# Patient Record
Sex: FEMALE | Race: WHITE | NOT HISPANIC OR LATINO | Employment: OTHER | URBAN - METROPOLITAN AREA
[De-identification: names, ages, dates, MRNs, and addresses within clinical notes are randomized per-mention and may not be internally consistent; named-entity substitution may affect disease eponyms.]

---

## 2017-07-31 ENCOUNTER — TRANSCRIBE ORDERS (OUTPATIENT)
Dept: ADMINISTRATIVE | Facility: HOSPITAL | Age: 52
End: 2017-07-31

## 2017-07-31 DIAGNOSIS — Z12.31 VISIT FOR SCREENING MAMMOGRAM: Primary | ICD-10-CM

## 2017-08-04 ENCOUNTER — HOSPITAL ENCOUNTER (OUTPATIENT)
Dept: MAMMOGRAPHY | Facility: HOSPITAL | Age: 52
Discharge: HOME | End: 2017-08-04
Payer: COMMERCIAL

## 2017-08-04 DIAGNOSIS — Z12.31 VISIT FOR SCREENING MAMMOGRAM: ICD-10-CM

## 2017-08-04 PROCEDURE — G0202 SCR MAMMO BI INCL CAD: HCPCS

## 2017-08-07 ENCOUNTER — GENERIC CONVERSION - ENCOUNTER (OUTPATIENT)
Dept: OTHER | Facility: OTHER | Age: 52
End: 2017-08-07

## 2017-08-09 ENCOUNTER — ALLSCRIPTS OFFICE VISIT (OUTPATIENT)
Dept: OTHER | Facility: OTHER | Age: 52
End: 2017-08-09

## 2017-09-09 DIAGNOSIS — D50.9 IRON DEFICIENCY ANEMIA: ICD-10-CM

## 2017-09-09 DIAGNOSIS — E78.5 HYPERLIPIDEMIA: ICD-10-CM

## 2017-09-09 DIAGNOSIS — E03.9 HYPOTHYROIDISM: ICD-10-CM

## 2017-09-09 DIAGNOSIS — D47.3 ESSENTIAL HEMORRHAGIC THROMBOCYTHEMIA (HCC): ICD-10-CM

## 2017-09-21 ENCOUNTER — GENERIC CONVERSION - ENCOUNTER (OUTPATIENT)
Dept: OTHER | Facility: OTHER | Age: 52
End: 2017-09-21

## 2017-09-21 ENCOUNTER — LAB CONVERSION - ENCOUNTER (OUTPATIENT)
Dept: OTHER | Facility: OTHER | Age: 52
End: 2017-09-21

## 2017-09-21 LAB — TSH SERPL DL<=0.05 MIU/L-ACNC: 0.7 MIU/L

## 2017-12-21 DIAGNOSIS — E03.9 HYPOTHYROIDISM: ICD-10-CM

## 2018-01-10 NOTE — RESULT NOTES
Message   Recorded as Task   Date: 09/29/2016 09:43 AM, Created By: Ursula Zelaya   Task Name: Call Patient with results   Assigned To: Flavio Hermosillo   Regarding Patient: Mohini Petersen, Status: Active   Comment:    Flavio Hermosillo - 29 Sep 2016 9:43 AM     Patient Phone: [phone]    His hemoglobin has risen to 10 1  She still needs to see GI but this is much improved  Repeat CBC in one month  Patient unfortunately still has blood in her urine  Please schedule ultrasound of renal bladder refer to urology   Natalie Kohler - 03 Oct 2016 2:45 PM     TASK EDITED                 pt notified of results and recommendations  she thinks we "are going down the wrong road" with additional testing and specialist consults  orders for ultrasound, repeat CBC, and urology consult mailed to patient  Plan  Hematuria    · US KIDNEY AND BLADDER; Status:Hold For - Scheduling,Retrospective By Protocol  Authorization;  Requested MJY:[id number];    · *1 - [facility] Physician Referral  Consult  Status: Active -  Retrospective By Protocol Authorization  Requested for: [id number]  Care Summary provided  : Yes    Signatures   Electronically signed by : Luz Marina Morales, ; Oct  3 2016  3:06PM EST                       (Author)

## 2018-01-11 NOTE — RESULT NOTES
Verified Results  (Q) [address], SCREEN [id number] 12:32PM Flavio Hermosillo   REPORT COMMENT:  FASTING:UNKNOWN     Test Name Result Flag Reference   COLOR YELLOW  YELLOW   APPEARANCE CLOUDY A CLEAR   SPECIFIC GRAVITY 1 023  1 001-1 035   PH 6 5  5 0-8 0   GLUCOSE NEGATIVE  NEGATIVE   KETONES NEGATIVE  NEGATIVE   OCCULT BLOOD TRACE A NEGATIVE   PROTEIN NEGATIVE  NEGATIVE

## 2018-01-11 NOTE — MISCELLANEOUS
Message   Recorded as Task   Date: 10/03/2016 04:34 PM, Created By: Ibis Cline   Task Name: Follow Up   Assigned To: Deven and Jenny,Clinical Team   Regarding Patient: Denise Schrader, Status: Active   CommentHildred Karin - 03 Oct 2016 4:34 PM     TASK CREATED  pt requested order for another U/A stating she was at end of menses for last test   per TS, ok for pt to repeat test   she will  order to take to [facility]        Active Problems    1  Hematuria (599 70) (R31 9)   2  Hematuria, microscopic (599 72) (R31 29)   3  Hyperlipidemia (272 4) (E78 5)   4  Hypocalcemia (275 41) (E83 51)   5  History of Hyponatremia (276 1) (E87 1)   6  Hypothyroidism (244 9) (E03 9)   7  Menorrhagia (626 2) (N92 0)   8  Microcytic anemia (280 9) (D50 9)   9  Screening for breast cancer (V76 10) (Z12 39)   10  Screening for cervical cancer (V76 2) (Z12 4)   11  Screening for colon cancer (V76 51) (Z12 11)    Current Meds   1  Calcium 600 MG Oral Tablet; TAKE 2 TABLETS ONCE DAILY; Therapy: 70WHM8298 to (Last Rx:69Fed3926) Ordered   2  Iron 325 (65 Fe) MG Oral Tablet; Take 1 tablet twice a day Recorded   3  Levothyroxine Sodium 112 MCG Oral Tablet; Take 1 tablet daily; Therapy: 03ZUW1372 to (Last LE:58SKZ1727)  Requested for: 82MZG5845; Status:   ACTIVE - Transmit to Pharmacy - Awaiting Verification Ordered   4  Pravastatin Sodium 40 MG Oral Tablet; Take 1 tablet daily; Therapy: 88CPR6142 to (Evaluate:72Yks9127)  Requested for: 59SML9695; Last   Rx:[id number] Ordered    Allergies    1   No Known Drug Allergies    Signatures   Electronically signed by : Em Seth, ; Oct  3 2016  4:42PM EST                       (Author)

## 2018-01-11 NOTE — RESULT NOTES
Message   Recorded as Task   Date: 09/29/2016 09:43 AM, Created By: Yamile Sylvester   Task Name: Call Patient with results   Assigned To: Flavio Hermosillo   Regarding Patient: Augustine Garcia, Status: Active   Comment:    Flavio Hermosillo - 29 Sep 2016 9:43 AM     Patient Phone: [phone]    His hemoglobin has risen to 10 1  She still needs to see GI but this is much improved  Repeat CBC in one month  Patient unfortunately still has blood in her urine  Please schedule ultrasound of renal bladder refer to urology   Natalie Kohler - 03 Oct 2016 2:45 PM     TASK EDITED                 pt notified of results and recommendations  she thinks we "are going down the wrong road" with additional testing and specialist consults  orders for ultrasound, repeat CBC, and urology consult mailed to patient       Signatures   Electronically signed by : Marcela Jacobsen, ; Oct  3 2016  2:46PM EST                       (Author)

## 2018-01-12 NOTE — RESULT NOTES
Message   Recorded as Task   Date: 08/07/2017 04:19 PM, Created By: Flavio Hermosillo   Task Name: Call Back   Assigned To: Deven and Jenny,Clinical Team   Regarding Patient: Lisa Headley, Status: In Progress   Comment:    Flavio Hermosillo - 07 Aug 2017 4:19 PM     TASK CREATED  Patient is very high lipids and TSH from a specialist to sinus copies of results    Patient needs to make an appointment within the next week for treatment   Natalie Kohler - 07 Aug 2017 5:05 PM     TASK IN PROGRESS   Natalie Kohler - 07 Aug 2017 5:17 PM     TASK EDITED  pt notified of out of range results - follow up appt  scheduled with KB        Signatures   Electronically signed by : Joshua Shaffer, ; Aug  7 2017  5:17PM EST                       (Author)

## 2018-01-12 NOTE — MISCELLANEOUS
Message   Recorded as Task   Date: 09/27/2016 08:01 AM, Created By: System   Task Name: Schedule Appointment   Assigned To: Chastity,Clinical Team   Regarding Patient: Noris Rubi, Status: In Progress   Comment:    System - 27 Sep 2016 8:01 AM     Preferred Communication: Mail  [facility]ing [facility]: [facility]    Referred To: [facility]  To Be Done: 27 Sep 2016   Abigail Petit - 10 Oct 2016 12:20 PM     TASK REASSIGNED: Previously Assigned To Candelaria Walton - 10 Oct 2016 12:25 PM     TASK IN PROGRESS   Juan Mena - 10 Oct 2016 3:17 PM     TASK EDITED                 [phone]   Farideh Babin - 10 Oct 2016 4:01 PM     TASK EDITED  10/10 spoke with pt and stated she is having kidney/bladder us and stool test to cover colonoscopy  pt was explained colonscopy is recommended at 51 and to speak to ordering dr  thank you   Adilene Erickson - 11 Oct 2016 3:12 PM     TASK REASSIGNED: Previously Assigned To CHASTITY,Team   Flavio Hermosillo - 11 Oct 2016 3:42 PM     TASK REPLIED TO: Previously Assigned To Chastity,Clinical Team    The things that she has described the ultrasound and a stool test does not a replacement for colonoscopy  Patient needs to be evaluated by gastroenterology to rule out GI blood losses cause of her anemia  This can be as simple as irritation as serious as a tumor   Marissa Sutton - 15 Oct 2016 8:37 AM     TASK EDITED   Natalie Kohler - 17 Oct 2016 2:55 PM     TASK IN PROGRESS   Natalie Kohler - 17 Oct 2016 2:58 PM     TASK EDITED                 [facility] informing pt of TS response and recommendation        Active Problems    1  Hematuria (599 70) (R31 9)   2  Hematuria, microscopic (599 72) (R31 29)   3  Hyperlipidemia (272 4) (E78 5)   4  Hypocalcemia (275 41) (E83 51)   5  History of Hyponatremia (276 1) (E87 1)   6  Hypothyroidism (244 9) (E03 9)   7  Menorrhagia (626 2) (N92 0)   8   Microcytic anemia (280 9) (D50 9)   9  Screening for breast cancer (V76 10) (Z12 39)   10  Screening for cervical cancer (V76 2) (Z12 4)   11  Screening for colon cancer (V76 51) (Z12 11)    Current Meds   1  Calcium 600 MG Oral Tablet; TAKE 2 TABLETS ONCE DAILY; Therapy: 55QBE7603 to (Last Rx:59Buw7720) Ordered   2  Iron 325 (65 Fe) MG Oral Tablet; Take 1 tablet twice a day Recorded   3  Levothyroxine Sodium 112 MCG Oral Tablet; Take 1 tablet daily; Therapy: 91UIP0141 to (Last Rx:13Oct2016)  Requested for: 13Oct2016 Ordered   4  Pravastatin Sodium 40 MG Oral Tablet; Take 1 tablet daily; Therapy: 49ZUQ7912 to (Evaluate:84Lom3168)  Requested for: 64PKQ2879; Last   Rx:18Ifx5047 Ordered    Allergies    1   No Known Drug Allergies    Signatures   Electronically signed by : William Jorge, ; Oct 17 2016  2:59PM EST                       (Author)

## 2018-01-12 NOTE — RESULT NOTES
Verified Results  (1) COMPREHENSIVE METABOLIC PANEL [id number] 96:16VE Andrea Hermosillo     Test Name Result Flag Reference   GLUCOSE 87 mg/dL  65-99   Fasting reference interval   UREA NITROGEN (BUN) 12 mg/dL  7-25   CREATININE 0 86 mg/dL  0 50-1 05   For patients >52years of age, the reference limit  for Creatinine is approximately 13% higher for people  identified as -American  eGFR NON-AFR  AMERICAN 79 mL/min/1 73m2  > OR = 60   eGFR AFRICAN AMERICAN 91 mL/min/1 73m2  > OR = 60   BUN/CREATININE RATIO   6-43   NOT APPLICABLE (calc)   SODIUM 133 mmol/L L 135-146   POTASSIUM 4 4 mmol/L  3 5-5 3   CHLORIDE 100 mmol/L     CARBON DIOXIDE 20 mmol/L  19-30   CALCIUM 9 2 mg/dL  8 6-10 4   PROTEIN, TOTAL 7 1 g/dL  6 1-8 1   ALBUMIN 4 3 g/dL  3 6-5 1   GLOBULIN 2 8 g/dL (calc)  1 9-3 7   ALBUMIN/GLOBULIN RATIO 1 5 (calc)  1 0-2 5   BILIRUBIN, TOTAL 0 4 mg/dL  0 2-1 2   ALKALINE PHOSPHATASE 83 U/L     AST 13 U/L  10-35   ALT 11 U/L  6-29     (1) LIPID PANEL, FASTING [id number] 09:27AM Flavio Hermosillo     Test Name Result Flag Reference   CHOLESTEROL, TOTAL 331 mg/dL H 125-200   HDL CHOLESTEROL 114 mg/dL  > OR = 46   TRIGLICERIDES 017 mg/dL H <150   LDL-CHOLESTEROL 173 mg/dL (calc) H <130   Desirable range <100 mg/dL for patients with CHD or  diabetes and <70 mg/dL for diabetic patients with  known heart disease  CHOL/HDLC RATIO 2 9 (calc)  < OR = 5 0   NON HDL CHOLESTEROL 217 mg/dL (calc) H    Target for non-HDL cholesterol is 30 mg/dL higher than   LDL cholesterol target       (1) VITAMIN B12 [id number] 09:27AM Flavio Hermosillo     Test Name Result Flag Reference   VITAMIN B12 346 pg/mL  200-1100   Please Note: Although the reference range for vitamin  B12 is 200-1100 pg/mL, it has been reported that between  5 and 10% of patients with values between 200 and 400  pg/mL may experience neuropsychiatric and hematologic  abnormalities due to occult B12 deficiency; less than 1%  of patients with values above 400 pg/mL will have symptoms       (1) FOLATE [id number] 09:27AM JaimeeDanielle     Test Name Result Flag Reference   FOLATE, SERUM 13 7 ng/mL     Reference Range                             Low:           <3 4                             Borderline:    3 4-5 4                             Normal:        >5 4     (1) IRON [id number] 09:27AM Flavio Hermosillo     Test Name Result Flag Reference   IRON, TOTAL 192 mcg/dL H      (Q) CBC (H/H, RBC, INDICES, WBC, PLT) [id number] 09:27AM Flavio Hermosillo     Test Name Result Flag Reference   WHITE BLOOD CELL COUNT 6 9 Thousand/uL  3 8-10 8   RED BLOOD CELL COUNT 3 89 Million/uL  3 80-5 10   HEMOGLOBIN 11 0 g/dL L 11 7-15 5   HEMATOCRIT 34 5 % L 35 0-45 0   MCV 88 8 fL  80 0-100 0   MCH 28 4 pg  27 0-33 0   MCHC 32 0 g/dL  32 0-36 0   RDW 14 5 %  11 0-15 0   PLATELET COUNT 061 Thousand/uL H 140-400   MPV 8 4 fL  7 5-11 5     (1) OP ALIS FERRITIN [id number] 09:27AM Flavio Hermosillo     Test Name Result Flag Reference   FERRITIN 19 ng/mL       (Q) TSH, 3RD GENERATION [id number] 09:27AM Flavio Hermosillo     Test Name Result Flag Reference   TSH 35 38 mIU/L H    Reference Range                         > or = 20 Years  0 40-4 50                              Pregnancy Ranges            First trimester    0 26-2 66            Second trimester   0 55-2 73            Third trimester    0 43-2 91     (Q) URINALYSIS REFLEX [id number] 09:27AM Flavio Hermosillo   REPORT COMMENT:  FASTING:YES     Test Name Result Flag Reference   COLOR YELLOW  YELLOW   APPEARANCE CLEAR  CLEAR   SPECIFIC GRAVITY 1 018  1 001-1 035   PH 6 0  5 0-8 0   GLUCOSE NEGATIVE  NEGATIVE   BILIRUBIN NEGATIVE  NEGATIVE   KETONES NEGATIVE  NEGATIVE   OCCULT BLOOD TRACE A NEGATIVE   PROTEIN NEGATIVE  NEGATIVE   NITRITE NEGATIVE  NEGATIVE   LEUKOCYTE ESTERASE 2+ A NEGATIVE   WBC 40-60 /HPF A < OR = 5   RBC 3-10 /HPF A < OR = 2   SQUAMOUS EPITHELIAL CELLS 20-40 /HPF A < OR = 5   BACTERIA MANY /HPF A NONE SEEN   HYALINE CAST 0-1 /LPF A NONE SEEN   COMMENTS FEW MUCOUS THREADS

## 2018-01-13 VITALS
HEIGHT: 65 IN | HEART RATE: 72 BPM | DIASTOLIC BLOOD PRESSURE: 70 MMHG | BODY MASS INDEX: 21.83 KG/M2 | WEIGHT: 131 LBS | SYSTOLIC BLOOD PRESSURE: 118 MMHG

## 2018-01-14 NOTE — RESULT NOTES
Verified Results  (1) IRON 27Sep2016 08:29AM Flavio Hermosillo     Test Name Result Flag Reference   IRON, TOTAL 274 mcg/dL H      (1) URINALYSIS w URINE C/S REFLEX (will reflex a microscopy if leukocytes, occult blood, or nitrites are not within normal limits) 27Sep2016 08:29AM Flavio Hermosillo     Test Name Result Flag Reference   COLOR YELLOW  YELLOW   APPEARANCE CLEAR  CLEAR   SPECIFIC GRAVITY 1 012  1 001-1 035   PH 5 5  5 0-8 0   GLUCOSE NEGATIVE  NEGATIVE   BILIRUBIN NEGATIVE  NEGATIVE   KETONES NEGATIVE  NEGATIVE   OCCULT BLOOD 1+ A NEGATIVE   PROTEIN NEGATIVE  NEGATIVE   NITRITE NEGATIVE  NEGATIVE   LEUKOCYTE ESTERASE 3+ A NEGATIVE   WBC 20-40 /HPF A < OR = 5   RBC 0-2 /HPF  < OR = 2   SQUAMOUS EPITHELIAL CELLS 0-5 /HPF  < OR = 5   BACTERIA FEW /HPF A NONE SEEN   HYALINE CAST NONE SEEN /LPF  NONE SEEN   REFLEXIVE URINE CULTURE      CULTURE INDICATED - RESULTS TO FOLLOW     (Q) HEMOGLOBINOPATHY EVALUATION 27Sep2016 08:29AM Flavio Hermosillo     Test Name Result Flag Reference   RED BLOOD CELL COUNT 4 01 Million/uL  3 80-5 10   HEMOGLOBIN 10 1 g/dL L 11 7-15 5   HEMATOCRIT 32 5 % L 35 0-45 0   MCV 81 2 fL  80 0-100 0   MCH 25 3 pg L 27 0-33 0   RDW 19 9 % H 11 0-15 0   HEMOGLOBIN A 96 8 %  >96 0   HEMOGLOBIN F <1 0 %  <2 0   HEMOGLOBIN A2 (QUANT) 2 2 %  1 8-3 5   INTERPRETATION      Normal phenotype       (Q) CBC (H/H, RBC, INDICES, WBC, PLT) 27Sep2016 08:29AM Flavio Hermosillo     Test Name Result Flag Reference   WHITE BLOOD CELL COUNT 5 4 Thousand/uL  3 8-10 8   RED BLOOD CELL COUNT 4 01 Million/uL  3 80-5 10   HEMOGLOBIN 10 1 g/dL L 11 7-15 5   HEMATOCRIT 32 5 % L 35 0-45 0   MCV 81 2 fL  80 0-100 0   MCH 25 3 pg L 27 0-33 0   MCHC 31 2 g/dL L 32 0-36 0   RDW 19 9 % H 11 0-15 0   PLATELET COUNT 830 Thousand/uL H 140-400   MPV 8 8 fL  7 5-11 5     (1) OP ALIS FERRITIN 27Sep2016 08:29AM Schmeltzle, Flavio     Test Name Result Flag Reference   FERRITIN 19 ng/mL       REFLEXIVE URINE CULTURE [id number] 08:29AM Flavio Hermosillo   REPORT COMMENT:  FASTING:NO     Test Name Result Flag Reference   CULTURE, URINE, ROUTINE      CULTURE, URINE, ROUTINE         MICRO NUMBER:      [id number]    TEST STATUS:       FINAL    SPECIMEN SOURCE:   URINE    SPECIMEN QUALITY:  ADEQUATE    RESULT:            Multiple organisms present, each less than 10,000                       CFU/mL  These organisms, commonly found on                       external and internal genitalia, are considered                       to be colonizers  No further testing performed

## 2018-01-15 NOTE — RESULT NOTES
Verified Results  (Q) T4, TOTAL (THYROXINE) [id number] 10:44AM Flavio Hermosillo     Test Name Result Flag Reference   T4, TOTAL (THYROXINE) 17 4 mcg/dL H 4 8-10 4   Adult (Pregnancy) Reference Ranges for Total T4:        1st Trimester:   6 4-15 2 mcg/dL     2nd Trimester:   7 4-15 2 mcg/dL     3rd Trimester:   7 7-13 8 mcg/dL     All Trimesters       Together:      7 0-14 7 mcg/dL     Conversion factor: 1 mcg/dL = 12 9 nmol/L

## 2018-01-16 NOTE — RESULT NOTES
Verified Results  (1) COMPREHENSIVE METABOLIC PANEL [id number] 32:97QQ MacrinajenFlavio garcía     Test Name Result Flag Reference   GLUCOSE 89 mg/dL  65-99   Fasting reference interval   UREA NITROGEN (BUN) 13 mg/dL  7-25   CREATININE 0 81 mg/dL  0 50-1 05   For patients >52years of age, the reference limit  for Creatinine is approximately 13% higher for people  identified as -American  eGFR NON-AFR  AMERICAN 85 mL/min/1 73m2  > OR = 60   eGFR AFRICAN AMERICAN 98 mL/min/1 73m2  > OR = 60   BUN/CREATININE RATIO   2-54   NOT APPLICABLE (calc)   ALT 8 U/L  6-29   ALBUMIN 3 5 g/dL L 3 6-5 1   GLOBULIN 2 9 g/dL (calc)  1 9-3 7   ALBUMIN/GLOBULIN RATIO 1 2 (calc)  1 0-2 5   BILIRUBIN, TOTAL 0 4 mg/dL  0 2-1 2   ALKALINE PHOSPHATASE 76 U/L     AST 12 U/L  10-35   SODIUM 139 mmol/L  135-146   POTASSIUM 4 1 mmol/L  3 5-5 3   CHLORIDE 108 mmol/L     CARBON DIOXIDE 22 mmol/L  20-31   CALCIUM 8 4 mg/dL L 8 6-10 4   PROTEIN, TOTAL 6 4 g/dL  6 1-8 1     (1) T4, FREE [id number] 10:47AM Jaimee Flavio     Test Name Result Flag Reference   T4, FREE 1 3 ng/dL  0 8-1 8     (1) LDL,DIRECT [id number] 10:47AM Jaimee Flavio     Test Name Result Flag Reference   DIRECT  mg/dL  <130   Desirable range <100 mg/dL for patients with CHD or  diabetes and <70 mg/dL for diabetic patients with  known heart disease       (1) CHOLESTEROL, TOTAL [id number] 10:47AM Flavio Hermosillo     Test Name Result Flag Reference   CHOLESTEROL, TOTAL 220 mg/dL H 125-200     (1) TRIGLYCERIDE [id number] 10:47AM Jaimee Flavio     Test Name Result Flag Reference   TRIGLICERIDES 738 mg/dL  <150     (Q) CBC (H/H, RBC, INDICES, WBC, PLT) [id number] 10:47AM Jaimee Flavio     Test Name Result Flag Reference   WHITE BLOOD CELL COUNT 5 9 Thousand/uL  3 8-10 8   RED BLOOD CELL COUNT 3 64 Million/uL L 3 80-5 10   HEMOGLOBIN 9 1 g/dL L 11 7-15 5   HEMATOCRIT 28 8 % L 35 0-45 0   MCV 79 1 fL L 80 0-100 0   MCH 24 9 pg L 27 0-33 0 MCHC 31 4 g/dL L 32 0-36 0   RDW 19 4 % H 11 0-15 0   PLATELET COUNT 805 Thousand/uL H 140-400   MPV 8 4 fL  7 5-11 5     (Q) TSH, 3RD GENERATION [id number] 10:47AM Flavio Hermosillo     Test Name Result Flag Reference   TSH 0 12 mIU/L L    Reference Range                         > or = 20 Years  0 40-4 50                              Pregnancy Ranges            First trimester    0 26-2 66            Second trimester   0 55-2 73            Third trimester    0 43-2 91     (Q) URINALYSIS REFLEX 19Sep2016 10:47AM Flavio Hermosillo   REPORT COMMENT:  FASTING:YES     Test Name Result Flag Reference   COLOR DARK YELLOW  YELLOW   APPEARANCE CLOUDY A CLEAR   SPECIFIC GRAVITY 1 019  1 001-1 035   PH 5 5  5 0-8 0   GLUCOSE NEGATIVE  NEGATIVE   BILIRUBIN NEGATIVE  NEGATIVE   RBC > OR = 60 /HPF A < OR = 2   SQUAMOUS EPITHELIAL CELLS 0-5 /HPF  < OR = 5   BACTERIA NONE SEEN /HPF  NONE SEEN   HYALINE CAST NONE SEEN /LPF  NONE SEEN   NOTE See Below     This urine was analyzed for the presence of WBC,   RBC, bacteria, casts, and other formed elements  Only those elements seen were reported     KETONES NEGATIVE  NEGATIVE   OCCULT BLOOD 3+ A NEGATIVE   PROTEIN TRACE A NEGATIVE   NITRITE NEGATIVE  NEGATIVE   LEUKOCYTE ESTERASE 1+ A NEGATIVE   WBC 10-20 /HPF A < OR = 5

## 2018-01-17 NOTE — RESULT NOTES
Discussion/Summary   Please call the patient and let her know add that her thyroid TSH is now normal   No reason to change her strength but I do want to keep a closer eye on it and I have ordered a repeat TSH for 3 months  Verified Results  (Q) TSH, 3RD GENERATION [id number] 10:51AM Ben Mask     Test Name Result Flag Reference   TSH 0 70 mIU/L     Reference Range                         > or = 20 Years  0 40-4 50                              Pregnancy Ranges            First trimester    0 26-2 66            Second trimester   0 55-2 73            Third trimester    0 43-2 91       Plan  Hypothyroidism    · (1) TSH; Status:Active;  Requested for:69Bgx6697;     Signatures   Electronically signed by : Samantha Adan, [facility]; Sep 21 2017  8:58AM EST                       (Author)

## 2018-02-09 DIAGNOSIS — E03.9 HYPOTHYROIDISM: ICD-10-CM

## 2018-02-09 DIAGNOSIS — D50.9 IRON DEFICIENCY ANEMIA: ICD-10-CM

## 2018-02-09 DIAGNOSIS — E78.5 HYPERLIPIDEMIA: ICD-10-CM

## 2018-02-09 DIAGNOSIS — D47.3 ESSENTIAL HEMORRHAGIC THROMBOCYTHEMIA (HCC): ICD-10-CM

## 2018-09-06 LAB
ALBUMIN SERPL-MCNC: 3.9 G/DL (ref 3.6–5.1)
ALBUMIN/GLOB SERPL: 1.3 (CALC) (ref 1–2.5)
ALP SERPL-CCNC: 82 U/L (ref 33–130)
ALT SERPL-CCNC: 13 U/L (ref 6–29)
AST SERPL-CCNC: 13 U/L (ref 10–35)
BASOPHILS # BLD AUTO: 48 CELLS/UL (ref 0–200)
BASOPHILS NFR BLD AUTO: 0.7 %
BILIRUB SERPL-MCNC: 0.3 MG/DL (ref 0.2–1.2)
BUN SERPL-MCNC: 10 MG/DL (ref 7–25)
BUN/CREAT SERPL: NORMAL (CALC) (ref 6–22)
CALCIUM SERPL-MCNC: 9.2 MG/DL (ref 8.6–10.4)
CHLORIDE SERPL-SCNC: 105 MMOL/L (ref 98–110)
CHOLEST SERPL-MCNC: 260 MG/DL
CHOLEST/HDLC SERPL: 2.5 (CALC)
CO2 SERPL-SCNC: 23 MMOL/L (ref 20–32)
CREAT SERPL-MCNC: 0.87 MG/DL (ref 0.5–1.05)
EOSINOPHIL # BLD AUTO: 231 CELLS/UL (ref 15–500)
EOSINOPHIL NFR BLD AUTO: 3.4 %
ERYTHROCYTE [DISTWIDTH] IN BLOOD BY AUTOMATED COUNT: 12.2 % (ref 11–15)
GLOBULIN SER CALC-MCNC: 3.1 G/DL (CALC) (ref 1.9–3.7)
GLUCOSE SERPL-MCNC: 79 MG/DL (ref 65–99)
HCT VFR BLD AUTO: 36.7 % (ref 35–45)
HDLC SERPL-MCNC: 106 MG/DL
HGB BLD-MCNC: 11.8 G/DL (ref 11.7–15.5)
IRON SATN MFR SERPL: 30 % (CALC) (ref 11–50)
IRON SERPL-MCNC: 128 MCG/DL (ref 45–160)
LDLC SERPL CALC-MCNC: 101 MG/DL (CALC)
LYMPHOCYTES # BLD AUTO: 2026 CELLS/UL (ref 850–3900)
LYMPHOCYTES NFR BLD AUTO: 29.8 %
MCH RBC QN AUTO: 28.2 PG (ref 27–33)
MCHC RBC AUTO-ENTMCNC: 32.2 G/DL (ref 32–36)
MCV RBC AUTO: 87.8 FL (ref 80–100)
MONOCYTES # BLD AUTO: 530 CELLS/UL (ref 200–950)
MONOCYTES NFR BLD AUTO: 7.8 %
NEUTROPHILS # BLD AUTO: 3964 CELLS/UL (ref 1500–7800)
NEUTROPHILS NFR BLD AUTO: 58.3 %
NONHDLC SERPL-MCNC: 154 MG/DL (CALC)
PLATELET # BLD AUTO: 393 THOUSAND/UL (ref 140–400)
PMV BLD REES-ECKER: 10.8 FL (ref 7.5–12.5)
POTASSIUM SERPL-SCNC: 4.3 MMOL/L (ref 3.5–5.3)
PROT SERPL-MCNC: 7 G/DL (ref 6.1–8.1)
RBC # BLD AUTO: 4.18 MILLION/UL (ref 3.8–5.1)
SL AMB EGFR AFRICAN AMERICAN: 89 ML/MIN/1.73M2
SL AMB EGFR NON AFRICAN AMERICAN: 77 ML/MIN/1.73M2
SODIUM SERPL-SCNC: 138 MMOL/L (ref 135–146)
TIBC SERPL-MCNC: 427 MCG/DL (CALC) (ref 250–450)
TRIGL SERPL-MCNC: 394 MG/DL
TSH SERPL-ACNC: 0.42 MIU/L
WBC # BLD AUTO: 6.8 THOUSAND/UL (ref 3.8–10.8)

## 2018-09-17 ENCOUNTER — OFFICE VISIT (OUTPATIENT)
Dept: FAMILY MEDICINE CLINIC | Facility: CLINIC | Age: 53
End: 2018-09-17
Payer: COMMERCIAL

## 2018-09-17 VITALS
SYSTOLIC BLOOD PRESSURE: 130 MMHG | BODY MASS INDEX: 24.93 KG/M2 | DIASTOLIC BLOOD PRESSURE: 70 MMHG | WEIGHT: 149.8 LBS | HEART RATE: 64 BPM

## 2018-09-17 DIAGNOSIS — Z00.00 HEALTHCARE MAINTENANCE: ICD-10-CM

## 2018-09-17 DIAGNOSIS — D50.9 MICROCYTIC ANEMIA: ICD-10-CM

## 2018-09-17 DIAGNOSIS — E03.9 ACQUIRED HYPOTHYROIDISM: Primary | ICD-10-CM

## 2018-09-17 DIAGNOSIS — E78.2 MIXED HYPERLIPIDEMIA: ICD-10-CM

## 2018-09-17 DIAGNOSIS — E83.51 HYPOCALCEMIA: ICD-10-CM

## 2018-09-17 PROBLEM — D75.839 THROMBOCYTOSIS: Status: ACTIVE | Noted: 2017-08-09

## 2018-09-17 PROCEDURE — 99214 OFFICE O/P EST MOD 30 MIN: CPT | Performed by: PHYSICIAN ASSISTANT

## 2018-09-17 PROCEDURE — 1036F TOBACCO NON-USER: CPT | Performed by: PHYSICIAN ASSISTANT

## 2018-09-17 RX ORDER — LEVOTHYROXINE SODIUM 112 UG/1
1 TABLET ORAL DAILY
COMMUNITY
Start: 2016-09-27 | End: 2018-09-17 | Stop reason: SDUPTHER

## 2018-09-17 RX ORDER — PNV NO.95/FERROUS FUM/FOLIC AC 28MG-0.8MG
1 TABLET ORAL 2 TIMES DAILY
COMMUNITY

## 2018-09-17 RX ORDER — PRAVASTATIN SODIUM 40 MG
1 TABLET ORAL DAILY
COMMUNITY
Start: 2016-07-14 | End: 2018-09-17 | Stop reason: SDUPTHER

## 2018-09-17 RX ORDER — PRAVASTATIN SODIUM 40 MG
40 TABLET ORAL DAILY
Qty: 90 TABLET | Refills: 3 | Status: SHIPPED | OUTPATIENT
Start: 2018-09-17 | End: 2019-06-26 | Stop reason: SDUPTHER

## 2018-09-17 RX ORDER — LEVOTHYROXINE SODIUM 112 UG/1
112 TABLET ORAL DAILY
Qty: 90 TABLET | Refills: 3 | Status: SHIPPED | OUTPATIENT
Start: 2018-09-17 | End: 2019-06-26 | Stop reason: SDUPTHER

## 2018-09-17 RX ORDER — DROSPIRENONE AND ETHINYL ESTRADIOL 0.03MG-3MG
1 KIT ORAL
COMMUNITY
Start: 2018-06-19 | End: 2021-01-26

## 2018-09-17 NOTE — ASSESSMENT & PLAN NOTE
Pt  deferring colonoscopy  Had cologaurd  DEXA not due until 5 years after menopause starts but pt is still menruating

## 2018-09-17 NOTE — ASSESSMENT & PLAN NOTE
,  however triglycerides very elevated at 394 up from 200 from last year  Pt has admits in increase in carb intake  Will have her work on decreasing carbs and recheck in 6 months

## 2018-09-17 NOTE — PROGRESS NOTES
Assessment/Plan:    Acquired hypothyroidism  TSH within normal limits  Continue current medication  Recheck 1 year  Mixed hyperlipidemia  ,  however triglycerides very elevated at 394 up from 200 from last year  Pt has admits in increase in carb intake  Will have her work on decreasing carbs and recheck in 6 months  Hypocalcemia  Calcium within normal limits  Microcytic anemia  CBC within normal limits on a full iron twice daily due to heavy continued menses  Continue  Healthcare maintenance  Pt  deferring colonoscopy  Had cologaurd  DEXA not due until 5 years after menopause starts but pt is still menruating  Diagnoses and all orders for this visit:    Acquired hypothyroidism  -     TSH, 3rd generation with Free T4 reflex; Future  -     levothyroxine 112 mcg tablet; Take 1 tablet (112 mcg total) by mouth daily    Mixed hyperlipidemia  -     Lipid Panel with Direct LDL reflex; Future  -     Comprehensive metabolic panel; Future  -     CBC and differential; Future  -     pravastatin (PRAVACHOL) 40 mg tablet; Take 1 tablet (40 mg total) by mouth daily    Hypocalcemia  -     Comprehensive metabolic panel; Future    Microcytic anemia  -     CBC and differential; Future    Healthcare maintenance    Other orders  -     Discontinue: pravastatin (PRAVACHOL) 40 mg tablet; Take 1 tablet by mouth daily  -     Discontinue: levothyroxine 112 mcg tablet; Take 1 tablet by mouth daily  -     Ferrous Sulfate (IRON) 325 (65 Fe) MG TABS; Take 1 tablet by mouth 2 (two) times a day  -     drospirenone-ethinyl estradiol (BRADEN) 3-0 03 MG per tablet; Take 1 tablet by mouth          Subjective:     CC: Follow up to review blood work and medication refills  vu   Patient ID: Sera Mclain is a 47 y o  female        Sera Mclain is here for chronic conditions f/u including the diagnosis of Acquired hypothyroidism  (primary encounter diagnosis)  Mixed hyperlipidemia  Hypocalcemia  Microcytic anemia   Pt  states they are taking all medications as directed without complaints or side effects   Pt  had labs done prior to today's visit which included Recent Results (from the past 672 hour(s))  -Lipid Panel with Direct LDL reflex  Collection Time: 09/05/18  9:11 AM       Result                      Value             Ref Range           Total Cholesterol           260 (H)           <200 mg/dL          HDL                         106               >50 mg/dL           Triglycerides               394 (H)           <150 mg/dL          LDL Direct                  101 (H)           mg/dL (calc)        Chol HDLC Ratio             2 5               <5 0 (calc)         Non-HDL Cholesterol         154 (H)           <130 mg/dL (*  -TIBC  Collection Time: 09/05/18  9:11 AM       Result                      Value             Ref Range           Iron, Serum                 128               45 - 160 mcg*       Total Iron Binding Cap*     427               250 - 450 mc*       Iron Saturation             30                11 - 50 % (c*  -Comprehensive metabolic panel  Collection Time: 09/05/18  9:11 AM       Result                      Value             Ref Range           Glucose                     79                65 - 99 mg/dL       BUN                         10                7 - 25 mg/dL        Creatinine                  0 87              0 50 - 1 05 *       eGFR Non  Ameri*     77                > OR = 60 mL*       SL AMB EGFR  AM*     89                > OR = 60 mL*       SL AMB BUN/CREATININE *                       6 - 22 (calc)   NOT APPLICABLE       Sodium                      138               135 - 146 mm*       SL AMB POTASSIUM            4 3               3 5 - 5 3 mm*       Chloride                    105               98 - 110 mmo*       CO2                         23                20 - 32 mmol*       SL AMB CALCIUM              9 2               8 6 - 10 4 m*       SL AMB PROTEIN, TOTAL 7 0               6 1 - 8 1 g/*       Albumin                     3 9               3 6 - 5 1 g/*       Globulin                    3 1               1 9 - 3 7 g/*       SL AMB ALBUMIN/GLOBULI*     1 3               1 0 - 2 5 (c*       TOTAL BILIRUBIN             0 3               0 2 - 1 2 mg*       Alkaline Phosphatase        82                33 - 130 U/L        SL AMB AST                  13                10 - 35 U/L         SL AMB ALT                  13                6 - 29 U/L     -CBC and differential  Collection Time: 09/05/18  9:11 AM       Result                      Value             Ref Range           White Blood Cell Count      6 8               3 8 - 10 8 T*       Red Blood Cell Count        4 18              3 80 - 5 10 *       Hemoglobin                  11 8              11 7 - 15 5 *       HCT                         36 7              35 0 - 45 0 %       MCV                         87 8              80 0 - 100 0*       MCH                         28 2              27 0 - 33 0 *       MCHC                        32 2              32 0 - 36 0 *       RDW                         12 2              11 0 - 15 0 %       Platelet Count              393               140 - 400 Th*       SL AMB MPV                  10 8              7 5 - 12 5 fL       Neutrophils (Absolute)      3,964             1,500 - 7,80*       Lymphocytes (Absolute)      2,026             850 - 3,900 *       Monocytes (Absolute)        530               200 - 950 ce*       Eosinophils (Absolute)      231               15 - 500 patricia*       Basophils (Absolute)        48                0 - 200 cell*       Neutrophils                 58 3              %                   Lymphocytes                 29 8              %                   Monocytes                   7 8               %                   Eosinophils                 3 4               %                   Basophils Relative          0 7               %              -TSH, 3rd generation  Collection Time: 09/05/18  9:11 AM       Result                      Value             Ref Range           TSH                         0 42              mIU/L            Pt admits to eating more junk food and carbs than usual for her b/c she is missing her daughter who went away to college and she is experiencing empty nest  She is still  Having her menses and taking two iron tabs daily  The following portions of the patient's history were reviewed and updated as appropriate: allergies, current medications, past family history, past medical history, past social history, past surgical history and problem list     Review of Systems   Constitutional: Negative  HENT: Negative  Eyes: Negative  Respiratory: Negative  Cardiovascular: Negative  Gastrointestinal: Negative  Endocrine: Negative  Genitourinary: Negative  Musculoskeletal: Negative  Skin: Negative  Allergic/Immunologic: Negative  Neurological: Negative  Hematological: Negative  Psychiatric/Behavioral: Negative  Objective:      Vitals:    09/17/18 1528   BP: 130/70   BP Location: Left arm   Patient Position: Sitting   Pulse: 64   Weight: 67 9 kg (149 lb 12 8 oz)            Physical Exam   Constitutional: She is oriented to person, place, and time  She appears well-developed and well-nourished  No distress  HENT:   Head: Normocephalic and atraumatic  Eyes: Conjunctivae are normal  Right eye exhibits no discharge  Left eye exhibits no discharge  Neck: Neck supple  Carotid bruit is not present  Cardiovascular: Normal rate, regular rhythm and normal heart sounds  Exam reveals no gallop and no friction rub  No murmur heard  Pulmonary/Chest: Effort normal and breath sounds normal  No respiratory distress  She has no wheezes  She has no rales  Neurological: She is alert and oriented to person, place, and time  Skin: Skin is warm and dry  She is not diaphoretic     Psychiatric: She has a normal mood and affect  Judgment normal    Nursing note and vitals reviewed

## 2018-09-17 NOTE — PATIENT INSTRUCTIONS
Problem List Items Addressed This Visit        Endocrine    Acquired hypothyroidism - Primary     TSH within normal limits  Continue current medication  Recheck 1 year  Relevant Medications    levothyroxine 112 mcg tablet    Other Relevant Orders    TSH, 3rd generation with Free T4 reflex       Other    Mixed hyperlipidemia     ,  however triglycerides very elevated at 394 up from 200 from last year  Pt has admits in increase in carb intake  Will have her work on decreasing carbs and recheck in 6 months  Relevant Medications    pravastatin (PRAVACHOL) 40 mg tablet    Other Relevant Orders    Lipid Panel with Direct LDL reflex    Comprehensive metabolic panel    CBC and differential    Microcytic anemia     CBC within normal limits on a full iron twice daily due to heavy continued menses  Continue  Relevant Orders    CBC and differential    Hypocalcemia     Calcium within normal limits  Relevant Orders    Comprehensive metabolic panel    Healthcare maintenance     Pt  deferring colonoscopy  Had cologaurd  DEXA not due until 5 years after menopause starts but pt is still menruating

## 2019-06-24 ENCOUNTER — TELEPHONE (OUTPATIENT)
Dept: FAMILY MEDICINE CLINIC | Facility: CLINIC | Age: 54
End: 2019-06-24

## 2019-06-26 DIAGNOSIS — E78.2 MIXED HYPERLIPIDEMIA: ICD-10-CM

## 2019-06-26 DIAGNOSIS — E03.9 ACQUIRED HYPOTHYROIDISM: ICD-10-CM

## 2019-06-26 RX ORDER — LEVOTHYROXINE SODIUM 112 UG/1
112 TABLET ORAL DAILY
Qty: 90 TABLET | Refills: 0 | Status: SHIPPED | OUTPATIENT
Start: 2019-06-26 | End: 2019-08-19 | Stop reason: SDUPTHER

## 2019-06-26 RX ORDER — PRAVASTATIN SODIUM 40 MG
40 TABLET ORAL DAILY
Qty: 90 TABLET | Refills: 0 | Status: SHIPPED | OUTPATIENT
Start: 2019-06-26 | End: 2019-08-19 | Stop reason: SDUPTHER

## 2019-08-15 DIAGNOSIS — E03.9 ACQUIRED HYPOTHYROIDISM: ICD-10-CM

## 2019-08-15 DIAGNOSIS — E78.2 MIXED HYPERLIPIDEMIA: ICD-10-CM

## 2019-08-15 RX ORDER — LEVOTHYROXINE SODIUM 112 UG/1
112 TABLET ORAL DAILY
Qty: 90 TABLET | Refills: 0 | OUTPATIENT
Start: 2019-08-15

## 2019-08-15 RX ORDER — PRAVASTATIN SODIUM 40 MG
40 TABLET ORAL DAILY
Qty: 90 TABLET | Refills: 0 | OUTPATIENT
Start: 2019-08-15

## 2019-08-16 DIAGNOSIS — E78.2 MIXED HYPERLIPIDEMIA: ICD-10-CM

## 2019-08-16 DIAGNOSIS — E03.9 ACQUIRED HYPOTHYROIDISM: ICD-10-CM

## 2019-08-19 DIAGNOSIS — E78.2 MIXED HYPERLIPIDEMIA: ICD-10-CM

## 2019-08-19 DIAGNOSIS — E03.9 ACQUIRED HYPOTHYROIDISM: ICD-10-CM

## 2019-08-19 RX ORDER — LEVOTHYROXINE SODIUM 112 UG/1
112 TABLET ORAL DAILY
Qty: 90 TABLET | Refills: 0 | Status: SHIPPED | OUTPATIENT
Start: 2019-08-19 | End: 2019-08-19 | Stop reason: SDUPTHER

## 2019-08-19 RX ORDER — PRAVASTATIN SODIUM 40 MG
40 TABLET ORAL DAILY
Qty: 90 TABLET | Refills: 0 | Status: SHIPPED | OUTPATIENT
Start: 2019-08-19 | End: 2019-08-19 | Stop reason: SDUPTHER

## 2019-08-19 RX ORDER — PRAVASTATIN SODIUM 40 MG
40 TABLET ORAL DAILY
Qty: 90 TABLET | Refills: 0 | Status: SHIPPED | OUTPATIENT
Start: 2019-08-19 | End: 2020-06-01 | Stop reason: SDUPTHER

## 2019-08-19 RX ORDER — LEVOTHYROXINE SODIUM 112 UG/1
112 TABLET ORAL DAILY
Qty: 90 TABLET | Refills: 0 | Status: SHIPPED | OUTPATIENT
Start: 2019-08-19 | End: 2020-06-01 | Stop reason: SDUPTHER

## 2019-12-03 NOTE — TELEPHONE ENCOUNTER
KB, Pt aware that you stated she can not have refills before she gets labs done, Pt returned call and she stated that someone told her last month that these 2 scripts Levothyroxine and Pravastatin would be refilled and they never were called into pharmacy, Pt is asking again if we can refill these for 90 days due to having insurance issues 
RX enclosed   Please sign off
That is fine if someone wants to que this up for me to sign but in her chart refill did get sent and received by the pharmacy the last time    Ulysses Richards
Attending

## 2020-05-04 ENCOUNTER — TELEPHONE (OUTPATIENT)
Dept: FAMILY MEDICINE CLINIC | Facility: CLINIC | Age: 55
End: 2020-05-04

## 2020-05-04 DIAGNOSIS — E78.2 MIXED HYPERLIPIDEMIA: ICD-10-CM

## 2020-05-04 DIAGNOSIS — D50.9 MICROCYTIC ANEMIA: Primary | ICD-10-CM

## 2020-05-04 DIAGNOSIS — D75.839 THROMBOCYTOSIS: ICD-10-CM

## 2020-05-04 DIAGNOSIS — E03.9 ACQUIRED HYPOTHYROIDISM: ICD-10-CM

## 2020-05-18 ENCOUNTER — TELEPHONE (OUTPATIENT)
Dept: FAMILY MEDICINE CLINIC | Facility: CLINIC | Age: 55
End: 2020-05-18

## 2020-05-19 LAB
ALBUMIN SERPL-MCNC: 4 G/DL (ref 3.6–5.1)
ALBUMIN/GLOB SERPL: 1.3 (CALC) (ref 1–2.5)
ALP SERPL-CCNC: 96 U/L (ref 37–153)
ALT SERPL-CCNC: 12 U/L (ref 6–29)
AST SERPL-CCNC: 16 U/L (ref 10–35)
BASOPHILS # BLD AUTO: 74 CELLS/UL (ref 0–200)
BASOPHILS NFR BLD AUTO: 1.1 %
BILIRUB SERPL-MCNC: 0.3 MG/DL (ref 0.2–1.2)
BUN SERPL-MCNC: 11 MG/DL (ref 7–25)
BUN/CREAT SERPL: NORMAL (CALC) (ref 6–22)
CALCIUM SERPL-MCNC: 9.4 MG/DL (ref 8.6–10.4)
CHLORIDE SERPL-SCNC: 103 MMOL/L (ref 98–110)
CHOLEST SERPL-MCNC: 232 MG/DL
CHOLEST/HDLC SERPL: 2.3 (CALC)
CO2 SERPL-SCNC: 24 MMOL/L (ref 20–32)
CREAT SERPL-MCNC: 0.97 MG/DL (ref 0.5–1.05)
EOSINOPHIL # BLD AUTO: 161 CELLS/UL (ref 15–500)
EOSINOPHIL NFR BLD AUTO: 2.4 %
ERYTHROCYTE [DISTWIDTH] IN BLOOD BY AUTOMATED COUNT: 12.3 % (ref 11–15)
GLOBULIN SER CALC-MCNC: 3.1 G/DL (CALC) (ref 1.9–3.7)
GLUCOSE SERPL-MCNC: 85 MG/DL (ref 65–99)
HCT VFR BLD AUTO: 38.9 % (ref 35–45)
HDLC SERPL-MCNC: 99 MG/DL
HGB BLD-MCNC: 12.5 G/DL (ref 11.7–15.5)
LDLC SERPL CALC-MCNC: 85 MG/DL (CALC)
LYMPHOCYTES # BLD AUTO: 1822 CELLS/UL (ref 850–3900)
LYMPHOCYTES NFR BLD AUTO: 27.2 %
MCH RBC QN AUTO: 28.2 PG (ref 27–33)
MCHC RBC AUTO-ENTMCNC: 32.1 G/DL (ref 32–36)
MCV RBC AUTO: 87.6 FL (ref 80–100)
MONOCYTES # BLD AUTO: 375 CELLS/UL (ref 200–950)
MONOCYTES NFR BLD AUTO: 5.6 %
NEUTROPHILS # BLD AUTO: 4268 CELLS/UL (ref 1500–7800)
NEUTROPHILS NFR BLD AUTO: 63.7 %
NONHDLC SERPL-MCNC: 133 MG/DL (CALC)
PLATELET # BLD AUTO: 416 THOUSAND/UL (ref 140–400)
PMV BLD REES-ECKER: 10.7 FL (ref 7.5–12.5)
POTASSIUM SERPL-SCNC: 4.5 MMOL/L (ref 3.5–5.3)
PROT SERPL-MCNC: 7.1 G/DL (ref 6.1–8.1)
RBC # BLD AUTO: 4.44 MILLION/UL (ref 3.8–5.1)
SL AMB EGFR AFRICAN AMERICAN: 77 ML/MIN/1.73M2
SL AMB EGFR NON AFRICAN AMERICAN: 67 ML/MIN/1.73M2
SODIUM SERPL-SCNC: 138 MMOL/L (ref 135–146)
T4 FREE SERPL-MCNC: 1.4 NG/DL (ref 0.8–1.8)
TRIGL SERPL-MCNC: 362 MG/DL
TSH SERPL-ACNC: 0.15 MIU/L
WBC # BLD AUTO: 6.7 THOUSAND/UL (ref 3.8–10.8)

## 2020-06-01 ENCOUNTER — OFFICE VISIT (OUTPATIENT)
Dept: FAMILY MEDICINE CLINIC | Facility: CLINIC | Age: 55
End: 2020-06-01
Payer: COMMERCIAL

## 2020-06-01 VITALS
SYSTOLIC BLOOD PRESSURE: 120 MMHG | HEIGHT: 65 IN | TEMPERATURE: 97.6 F | RESPIRATION RATE: 16 BRPM | HEART RATE: 80 BPM | WEIGHT: 154 LBS | DIASTOLIC BLOOD PRESSURE: 74 MMHG | BODY MASS INDEX: 25.66 KG/M2

## 2020-06-01 DIAGNOSIS — E03.9 ACQUIRED HYPOTHYROIDISM: ICD-10-CM

## 2020-06-01 DIAGNOSIS — D50.9 MICROCYTIC ANEMIA: ICD-10-CM

## 2020-06-01 DIAGNOSIS — E78.2 MIXED HYPERLIPIDEMIA: ICD-10-CM

## 2020-06-01 DIAGNOSIS — D75.839 THROMBOCYTOSIS: ICD-10-CM

## 2020-06-01 DIAGNOSIS — Z12.31 ENCOUNTER FOR SCREENING MAMMOGRAM FOR BREAST CANCER: Primary | ICD-10-CM

## 2020-06-01 DIAGNOSIS — E83.51 HYPOCALCEMIA: ICD-10-CM

## 2020-06-01 PROCEDURE — 1036F TOBACCO NON-USER: CPT | Performed by: PHYSICIAN ASSISTANT

## 2020-06-01 PROCEDURE — 99214 OFFICE O/P EST MOD 30 MIN: CPT | Performed by: PHYSICIAN ASSISTANT

## 2020-06-01 PROCEDURE — 3008F BODY MASS INDEX DOCD: CPT | Performed by: PHYSICIAN ASSISTANT

## 2020-06-01 RX ORDER — LEVOTHYROXINE SODIUM 112 UG/1
112 TABLET ORAL DAILY
Qty: 90 TABLET | Refills: 3 | Status: SHIPPED | OUTPATIENT
Start: 2020-06-01 | End: 2021-02-24

## 2020-06-01 RX ORDER — PRAVASTATIN SODIUM 40 MG
40 TABLET ORAL DAILY
Qty: 90 TABLET | Refills: 3 | Status: SHIPPED | OUTPATIENT
Start: 2020-06-01 | End: 2021-02-24

## 2021-01-25 RX ORDER — ZINC GLUCONATE 50 MG
50 TABLET ORAL DAILY
COMMUNITY
End: 2022-02-11

## 2021-01-26 ENCOUNTER — TELEMEDICINE (OUTPATIENT)
Dept: FAMILY MEDICINE CLINIC | Facility: CLINIC | Age: 56
End: 2021-01-26
Payer: COMMERCIAL

## 2021-01-26 VITALS — BODY MASS INDEX: 23.32 KG/M2 | WEIGHT: 140 LBS | HEIGHT: 65 IN

## 2021-01-26 DIAGNOSIS — E78.2 MIXED HYPERLIPIDEMIA: Primary | ICD-10-CM

## 2021-01-26 DIAGNOSIS — D50.9 MICROCYTIC ANEMIA: ICD-10-CM

## 2021-01-26 DIAGNOSIS — M79.603 PAIN AND NUMBNESS OF UPPER EXTREMITY: ICD-10-CM

## 2021-01-26 DIAGNOSIS — E03.9 ACQUIRED HYPOTHYROIDISM: ICD-10-CM

## 2021-01-26 DIAGNOSIS — N95.1 MENOPAUSE SYNDROME: ICD-10-CM

## 2021-01-26 DIAGNOSIS — R20.0 PAIN AND NUMBNESS OF UPPER EXTREMITY: ICD-10-CM

## 2021-01-26 DIAGNOSIS — D75.839 THROMBOCYTOSIS: ICD-10-CM

## 2021-01-26 PROBLEM — G56.03 BILATERAL CARPAL TUNNEL SYNDROME: Status: RESOLVED | Noted: 2020-11-30 | Resolved: 2021-01-26

## 2021-01-26 PROBLEM — G56.03 BILATERAL CARPAL TUNNEL SYNDROME: Status: ACTIVE | Noted: 2020-11-30

## 2021-01-26 PROCEDURE — 3008F BODY MASS INDEX DOCD: CPT | Performed by: PHYSICIAN ASSISTANT

## 2021-01-26 PROCEDURE — 1036F TOBACCO NON-USER: CPT | Performed by: PHYSICIAN ASSISTANT

## 2021-01-26 PROCEDURE — 99214 OFFICE O/P EST MOD 30 MIN: CPT | Performed by: PHYSICIAN ASSISTANT

## 2021-01-26 NOTE — PATIENT INSTRUCTIONS
Problem List Items Addressed This Visit        Endocrine    Acquired hypothyroidism     Patient was supposed to have a TSH done in August which she did not do and then have a repeated again in November with the rest of her blood work which she also did not do  Given new blood work orders will go soon  Hematopoietic and Hemostatic    Thrombocytosis (HCC)     Check CBC  Other    Mixed hyperlipidemia - Primary     Patient has not had blood work since May and was due in November  Orders will be reprinted and sent the patient to do soon  Microcytic anemia     Patient is overdue for CBC and iron  She has to go soon  Relevant Orders    Iron Panel (Includes Ferritin, Iron Sat%, Iron, and TIBC)    MANUEL Screen w/ Reflex to Titer/Pattern    Pain and numbness of upper extremity     Paresthesias and pain and intermittent weakness started after patient stopped her birth control  She has somewhat medicated these symptoms with over-the-counter extensive amounts of B12  and B multivitamin  It very well could have been [place] dense after stopping her hormone birth control that she had been on for very long time or she could have issues including B12 deficiency or other  Since patient is taking be supplements for levels of this will probably not reflect any deficiency that was ongoing previous store starting but will check lab work anyway  If labs are normal and symptoms continue refer to hand specialist          Relevant Orders    Vitamin B12    Methylmalonic acid, serum    Folate    Vitamin B6    Niacin (vitamin B3)    Lyme Antibody Profile with reflex to WB    Menopause syndrome     Patient was having significant symptoms after having stopped her birth control was ordered by gyn at the age of 48  She has started over-the-counter progesterone estrogen and this is helping her symptoms  If she feels she needs hormone replacement therapy she is to contact her gyn for discussion

## 2021-01-26 NOTE — PROGRESS NOTES
Virtual Regular Visit      Assessment/Plan:    Problem List Items Addressed This Visit        Endocrine    Acquired hypothyroidism     Patient was supposed to have a TSH done in August which she did not do and then have a repeated again in November with the rest of her blood work which she also did not do  Given new blood work orders will go soon  Hematopoietic and Hemostatic    Thrombocytosis (HCC)     Check CBC  Other    Mixed hyperlipidemia - Primary     Patient has not had blood work since May and was due in November  Orders will be reprinted and sent the patient to do soon  Microcytic anemia     Patient is overdue for CBC and iron  She has to go soon  Relevant Orders    Iron Panel (Includes Ferritin, Iron Sat%, Iron, and TIBC)    MANUEL Screen w/ Reflex to Titer/Pattern    Pain and numbness of upper extremity     Paresthesias and pain and intermittent weakness started after patient stopped her birth control  She has somewhat medicated these symptoms with over-the-counter extensive amounts of B12  and B multivitamin  It very well could have been [place] dense after stopping her hormone birth control that she had been on for very long time or she could have issues including B12 deficiency or other  Since patient is taking be supplements for levels of this will probably not reflect any deficiency that was ongoing previous store starting but will check lab work anyway  If labs are normal and symptoms continue refer to hand specialist          Relevant Orders    Vitamin B12    Methylmalonic acid, serum    Folate    Vitamin B6    Niacin (vitamin B3)    Lyme Antibody Profile with reflex to WB    Menopause syndrome     Patient was having significant symptoms after having stopped her birth control was ordered by gyn at the age of 48  She has started over-the-counter progesterone estrogen and this is helping her symptoms    If she feels she needs hormone replacement therapy she is to contact her gyn for discussion  Reason for visit is   Chief Complaint   Patient presents with    Labs Only     states a couple weeks ago GYN recommended to dc BC and so she did  Pt would like to discuss hormone imbalance  Pt has got OTC medication to help  Pt states since the symptoms have gone away but still would like to discuss bw   Virtual Regular Visit        Encounter provider Sol Finn PA-C    Provider located at [address] [place] [address] [facility]  [facility] [id number]      Recent Visits  No visits were found meeting these conditions  Showing recent visits within past 7 days and meeting all other requirements     Today's Visits  Date Type Provider Dept   01/26/21 1135 Parks MIRELA Coker Pg [facility]   Showing today's visits and meeting all other requirements     Future Appointments  No visits were found meeting these conditions  Showing future appointments within next 150 days and meeting all other requirements        The patient was identified by name and date of birth  Ree Notice was informed that this is a telemedicine visit and that the visit is being conducted through [employer] and patient was informed that this is not a secure, HIPAA-compliant platform  She agrees to proceed     My office door was closed  No one else was in the room  She acknowledged consent and understanding of privacy and security of the video platform  The patient has agreed to participate and understands they can discontinue the visit at any time  Patient is aware this is a billable service  Subjective  Ree Notice is a 48 y o  female pt   Patient calling today very upset because her OBGYN took her off her birth control and within 6 weeks she developed bilateral neuropathy in both of her hands worse in her left but she was right-handed    She states that she started really not feeling well and started natural progesterone estrogen over-the-counter and this did help some of her symptoms except for her hands she did some research and thought that she could be deficient in B12 so she ended up taking B12 3000 mg a day and a multivitamin 3 a day of be she states that her hands would go numb and then progressive over time start also getting pain and then on her right hand she started having some and 2nd finger discomfort with different temperatures hot and cold and eventually her muscles felt stiff and tender  She was thinking that this was all because of hormone imbalance and that is why she wanted to speak with me today and she is very upset that she might have some autoimmune disease or something that would be causing this but the timing of her stopping the hormone just made her think that it was purely from this  She has not had a period since  Past Medical History:   Diagnosis Date    Hyponatremia     last assessed: 7/14/2016       Past Surgical History:   Procedure Laterality Date    RHINOPLASTY      7years old       Current Outpatient Medications   Medication Sig Dispense Refill    Ferrous Sulfate (IRON) 325 (65 Fe) MG TABS Take 1 tablet by mouth 2 (two) times a day      levothyroxine 112 mcg tablet Take 1 tablet (112 mcg total) by mouth daily 90 tablet 3    pravastatin (PRAVACHOL) 40 mg tablet Take 1 tablet (40 mg total) by mouth daily 90 tablet 3    zinc gluconate 50 mg tablet Take 50 mg by mouth daily       No current facility-administered medications for this visit  No Known Allergies    Review of Systems   Constitutional: Negative  HENT: Negative  Eyes: Negative  Respiratory: Negative  Cardiovascular: Negative  Gastrointestinal: Negative  Endocrine: Negative  Genitourinary: Negative  Musculoskeletal:        Numbness tingling and pain bilateral hands  Skin: Negative  Allergic/Immunologic: Negative  Neurological: Positive for weakness and numbness  Hematological: Negative  Psychiatric/Behavioral: Negative  Video Exam    Vitals:    01/26/21 1157   Weight: 63 5 kg (140 lb)   Height: 5' 5" (1 651 m)       Physical Exam  Vitals signs and nursing note reviewed  Constitutional:       General: She is not in acute distress  Appearance: She is well-developed  She is not diaphoretic  HENT:      Head: Normocephalic and atraumatic  Eyes:      General:         Right eye: No discharge  Left eye: No discharge  Conjunctiva/sclera: Conjunctivae normal       Pupils: Pupils are equal, round, and reactive to light  Pulmonary:      Effort: Pulmonary effort is normal  No respiratory distress  Skin:     General: Skin is warm and dry  Neurological:      Mental Status: She is alert and oriented to person, place, and time  Psychiatric:         Behavior: Behavior normal          Thought Content: Thought content normal          Judgment: Judgment normal           I spent 15 minutes directly with the patient during this visit      Boriñaur Enedilberto 30 acknowledges that she has consented to an online visit or consultation  She understands that the online visit is based solely on information provided by her, and that, in the absence of a face-to-face physical evaluation by the physician, the diagnosis she receives is both limited and provisional in terms of accuracy and completeness  This is not intended to replace a full medical face-to-face evaluation by the physician  Navjot Ramirez understands and accepts these terms

## 2021-01-26 NOTE — ASSESSMENT & PLAN NOTE
Patient was having significant symptoms after having stopped her birth control was ordered by gyn at the age of 48  She has started over-the-counter progesterone estrogen and this is helping her symptoms  If she feels she needs hormone replacement therapy she is to contact her gyn for discussion

## 2021-01-26 NOTE — ASSESSMENT & PLAN NOTE
Paresthesias and pain and intermittent weakness started after patient stopped her birth control  She has somewhat medicated these symptoms with over-the-counter extensive amounts of B12  and B multivitamin  It very well could have been [place] dense after stopping her hormone birth control that she had been on for very long time or she could have issues including B12 deficiency or other  Since patient is taking be supplements for levels of this will probably not reflect any deficiency that was ongoing previous store starting but will check lab work anyway    If labs are normal and symptoms continue refer to hand specialist

## 2021-01-26 NOTE — ASSESSMENT & PLAN NOTE
Patient has not had blood work since May and was due in November  Orders will be reprinted and sent the patient to do soon

## 2021-01-26 NOTE — ASSESSMENT & PLAN NOTE
Patient was supposed to have a TSH done in August which she did not do and then have a repeated again in November with the rest of her blood work which she also did not do  Given new blood work orders will go soon

## 2021-02-15 LAB
ALBUMIN SERPL-MCNC: 4.5 G/DL (ref 3.6–5.1)
ALBUMIN/GLOB SERPL: 1.6 (CALC) (ref 1–2.5)
ALP SERPL-CCNC: 125 U/L (ref 37–153)
ALT SERPL-CCNC: 21 U/L (ref 6–29)
ANA SER QL IF: NEGATIVE
AST SERPL-CCNC: 19 U/L (ref 10–35)
B BURGDOR AB SER QL IA: 0.93 INDEX
B BURGDOR IGG SER QL IB: NEGATIVE
B BURGDOR IGM SER QL IB: NEGATIVE
B BURGDOR18KD IGG SER QL IB: ABNORMAL
B BURGDOR23KD IGG SER QL IB: ABNORMAL
B BURGDOR23KD IGM SER QL IB: ABNORMAL
B BURGDOR28KD IGG SER QL IB: ABNORMAL
B BURGDOR30KD IGG SER QL IB: ABNORMAL
B BURGDOR39KD IGG SER QL IB: ABNORMAL
B BURGDOR39KD IGM SER QL IB: ABNORMAL
B BURGDOR41KD IGG SER QL IB: REACTIVE
B BURGDOR41KD IGM SER QL IB: ABNORMAL
B BURGDOR45KD IGG SER QL IB: ABNORMAL
B BURGDOR58KD IGG SER QL IB: ABNORMAL
B BURGDOR66KD IGG SER QL IB: ABNORMAL
B BURGDOR93KD IGG SER QL IB: ABNORMAL
BASOPHILS # BLD AUTO: 49 CELLS/UL (ref 0–200)
BASOPHILS NFR BLD AUTO: 0.7 %
BILIRUB SERPL-MCNC: 0.4 MG/DL (ref 0.2–1.2)
BUN SERPL-MCNC: 16 MG/DL (ref 7–25)
BUN/CREAT SERPL: NORMAL (CALC) (ref 6–22)
CALCIUM SERPL-MCNC: 9.7 MG/DL (ref 8.6–10.4)
CHLORIDE SERPL-SCNC: 102 MMOL/L (ref 98–110)
CHOLEST SERPL-MCNC: 237 MG/DL
CHOLEST/HDLC SERPL: 2.7 (CALC)
CO2 SERPL-SCNC: 26 MMOL/L (ref 20–32)
CREAT SERPL-MCNC: 0.92 MG/DL (ref 0.5–1.05)
EOSINOPHIL # BLD AUTO: 147 CELLS/UL (ref 15–500)
EOSINOPHIL NFR BLD AUTO: 2.1 %
ERYTHROCYTE [DISTWIDTH] IN BLOOD BY AUTOMATED COUNT: 13.5 % (ref 11–15)
FERRITIN SERPL-MCNC: 133 NG/ML (ref 16–232)
FOLATE SERPL-MCNC: >24 NG/ML
GLOBULIN SER CALC-MCNC: 2.9 G/DL (CALC) (ref 1.9–3.7)
GLUCOSE SERPL-MCNC: 83 MG/DL (ref 65–99)
HCT VFR BLD AUTO: 41.6 % (ref 35–45)
HDLC SERPL-MCNC: 89 MG/DL
HGB BLD-MCNC: 13.4 G/DL (ref 11.7–15.5)
IRON SATN MFR SERPL: 24 % (CALC) (ref 16–45)
IRON SERPL-MCNC: 78 MCG/DL (ref 45–160)
LDLC SERPL CALC-MCNC: 108 MG/DL (CALC)
LYMPHOCYTES # BLD AUTO: 2317 CELLS/UL (ref 850–3900)
LYMPHOCYTES NFR BLD AUTO: 33.1 %
MCH RBC QN AUTO: 28.5 PG (ref 27–33)
MCHC RBC AUTO-ENTMCNC: 32.2 G/DL (ref 32–36)
MCV RBC AUTO: 88.3 FL (ref 80–100)
METHYLMALONATE SERPL-SCNC: 129 NMOL/L (ref 87–318)
MONOCYTES # BLD AUTO: 532 CELLS/UL (ref 200–950)
MONOCYTES NFR BLD AUTO: 7.6 %
NEUTROPHILS # BLD AUTO: 3955 CELLS/UL (ref 1500–7800)
NEUTROPHILS NFR BLD AUTO: 56.5 %
NONHDLC SERPL-MCNC: 148 MG/DL (CALC)
PLATELET # BLD AUTO: 433 THOUSAND/UL (ref 140–400)
PMV BLD REES-ECKER: 10.1 FL (ref 7.5–12.5)
POTASSIUM SERPL-SCNC: 4.5 MMOL/L (ref 3.5–5.3)
PROT SERPL-MCNC: 7.4 G/DL (ref 6.1–8.1)
RBC # BLD AUTO: 4.71 MILLION/UL (ref 3.8–5.1)
SL AMB EGFR AFRICAN AMERICAN: 82 ML/MIN/1.73M2
SL AMB EGFR NON AFRICAN AMERICAN: 71 ML/MIN/1.73M2
SL AMB NICOTINAMIDE: 121 NG/ML
SL AMB NICOTINIC ACID: <20 NG/ML
SODIUM SERPL-SCNC: 139 MMOL/L (ref 135–146)
T4 FREE SERPL-MCNC: 1.1 NG/DL (ref 0.8–1.8)
TIBC SERPL-MCNC: 331 MCG/DL (CALC) (ref 250–450)
TRIGL SERPL-MCNC: 279 MG/DL
TSH SERPL-ACNC: 0.12 MIU/L
VIT B12 SERPL-MCNC: >2000 PG/ML (ref 200–1100)
VIT B6 SERPL-MCNC: 185.7 NG/ML (ref 2.1–21.7)
WBC # BLD AUTO: 7 THOUSAND/UL (ref 3.8–10.8)

## 2021-02-16 NOTE — RESULT ENCOUNTER NOTE
Please let pt know all of her labs are back and she needs to make an in office apt to review  2 slot preferable as there is a lot to review

## 2021-02-24 ENCOUNTER — OFFICE VISIT (OUTPATIENT)
Dept: FAMILY MEDICINE CLINIC | Facility: CLINIC | Age: 56
End: 2021-02-24
Payer: COMMERCIAL

## 2021-02-24 VITALS
BODY MASS INDEX: 26.99 KG/M2 | WEIGHT: 162 LBS | SYSTOLIC BLOOD PRESSURE: 120 MMHG | HEIGHT: 65 IN | TEMPERATURE: 98.6 F | DIASTOLIC BLOOD PRESSURE: 64 MMHG | HEART RATE: 88 BPM

## 2021-02-24 DIAGNOSIS — R20.0 PAIN AND NUMBNESS OF UPPER EXTREMITY: ICD-10-CM

## 2021-02-24 DIAGNOSIS — E03.9 ACQUIRED HYPOTHYROIDISM: ICD-10-CM

## 2021-02-24 DIAGNOSIS — M79.603 PAIN AND NUMBNESS OF UPPER EXTREMITY: ICD-10-CM

## 2021-02-24 DIAGNOSIS — D75.839 THROMBOCYTOSIS: Primary | ICD-10-CM

## 2021-02-24 DIAGNOSIS — D50.9 MICROCYTIC ANEMIA: ICD-10-CM

## 2021-02-24 DIAGNOSIS — E78.2 MIXED HYPERLIPIDEMIA: ICD-10-CM

## 2021-02-24 PROCEDURE — 1036F TOBACCO NON-USER: CPT | Performed by: PHYSICIAN ASSISTANT

## 2021-02-24 PROCEDURE — 3008F BODY MASS INDEX DOCD: CPT | Performed by: PHYSICIAN ASSISTANT

## 2021-02-24 PROCEDURE — 99214 OFFICE O/P EST MOD 30 MIN: CPT | Performed by: PHYSICIAN ASSISTANT

## 2021-02-24 RX ORDER — LEVOTHYROXINE SODIUM 88 UG/1
88 TABLET ORAL DAILY
Qty: 90 TABLET | Refills: 3 | Status: SHIPPED | OUTPATIENT
Start: 2021-02-24 | End: 2022-02-11 | Stop reason: SDUPTHER

## 2021-02-24 RX ORDER — ROSUVASTATIN CALCIUM 10 MG/1
10 TABLET, COATED ORAL DAILY
Qty: 90 TABLET | Refills: 3 | Status: SHIPPED | OUTPATIENT
Start: 2021-02-24 | End: 2022-02-11 | Stop reason: SDUPTHER

## 2021-02-24 RX ORDER — NIACIN 500 MG
500 TABLET ORAL
COMMUNITY
End: 2022-02-11

## 2021-02-24 RX ORDER — CHOLECALCIFEROL (VITAMIN D3) 125 MCG
500 CAPSULE ORAL DAILY
COMMUNITY

## 2021-02-24 NOTE — ASSESSMENT & PLAN NOTE
Platelets continue to be ever so slightly elevated at 433  I do not believe this warrants daily aspirin use and patient is no longer on birth control  Check yearly

## 2021-02-24 NOTE — ASSESSMENT & PLAN NOTE
TSH slightly low at 0 12 on 112 mcg daily supplementation  She is already on 6 days a week  At this time I am recommending reducing to 88 mcg daily and recheck in 8 weeks

## 2021-02-24 NOTE — ASSESSMENT & PLAN NOTE
LDL controlled at 108 however triglycerides are significantly elevated at 279 which is above 150  This is however improved from a 362 at the last check  Continue decrease carbohydrate intake including rice potatoes bread and pasta  Pt currently taking pravastatin and we are going to change her to crestor 10 mg to see if her muscle aches and paresthesia resolve or lessen

## 2021-02-24 NOTE — PROGRESS NOTES
Assessment and Plan:    Problem List Items Addressed This Visit        Endocrine    Acquired hypothyroidism       TSH slightly low at 0 12 on 112 mcg daily supplementation  She is already on 6 days a week  At this time I am recommending reducing to 88 mcg daily and recheck in 8 weeks  Relevant Medications    levothyroxine 88 mcg tablet    Other Relevant Orders    TSH, 3rd generation with Free T4 reflex    TSH, 3rd generation with Free T4 reflex       Hematopoietic and Hemostatic    Thrombocytosis (HCC) - Primary       Platelets continue to be ever so slightly elevated at 433  I do not believe this warrants daily aspirin use and patient is no longer on birth control  Check yearly  Relevant Medications    vitamin B-12 (VITAMIN B-12) 500 mcg tablet       Other    Mixed hyperlipidemia       LDL controlled at 108 however triglycerides are significantly elevated at 279 which is above 150  This is however improved from a 362 at the last check  Continue decrease carbohydrate intake including rice potatoes bread and pasta  Pt currently taking pravastatin and we are going to change her to crestor 10 mg to see if her muscle aches and paresthesia resolve or lessen  Relevant Medications    rosuvastatin (CRESTOR) 10 MG tablet    Other Relevant Orders    Lipid Panel with Direct LDL reflex    Comprehensive metabolic panel    Microcytic anemia       Continue iron RBC hemoglobin hematocrit within normal limits  Check yearly  Relevant Medications    vitamin B-12 (VITAMIN B-12) 500 mcg tablet    Pain and numbness of upper extremity       Per patient could be a side effect of statin would like to change will try Crestor verses Pravachol and recheck in 6 months  Diagnoses and all orders for this visit:    Thrombocytosis ([address] )    Acquired hypothyroidism  -     levothyroxine 88 mcg tablet;  Take 1 tablet (88 mcg total) by mouth daily  -     TSH, 3rd generation with Free T4 reflex; Future  -     TSH, 3rd generation with Free T4 reflex; Future    Mixed hyperlipidemia  -     Lipid Panel with Direct LDL reflex; Future  -     Comprehensive metabolic panel; Future  -     rosuvastatin (CRESTOR) 10 MG tablet; Take 1 tablet (10 mg total) by mouth daily    Microcytic anemia    Pain and numbness of upper extremity    Other orders  -     niacin 500 mg tablet; Take 500 mg by mouth daily with breakfast  -     Magnesium 400 MG CAPS; Take by mouth  -     b complex vitamins tablet; Take 1 tablet by mouth daily  -     vitamin B-12 (VITAMIN B-12) 500 mcg tablet; Take 500 mcg by mouth daily              Subjective:      Patient ID: Antonella Yang is a 48 y o  female  CC:    Chief Complaint   Patient presents with    Hypothyroidism     Review BW results  Pt states she has added a few supplements since discontinuing oral birth control   -  lsh    Hyperlipidemia    Anemia       HPI:      Antonella Yang is here for chronic conditions f/u including the diagnosis of Thrombocytosis (hcc)  (primary encounter diagnosis)  Acquired hypothyroidism  Mixed hyperlipidemia  Microcytic anemia  Pain and numbness of upper extremity   Pt  states they are taking all medications as directed without complaints or side effects   Pt  had labs done prior to today's visit which included Recent Results (from the past 672 hour(s))  -Lipid Panel with Direct LDL reflex  Collection Time: 02/10/21 10:31 AM       Result                      Value             Ref Range           Total Cholesterol           237 (H)           <200 mg/dL          HDL                         89                > OR = 50 mg*       Triglycerides               279 (H)           <150 mg/dL          LDL Calculated              108 (H)           mg/dL (calc)        Chol HDLC Ratio             2 7               <5 0 (calc)         Non-HDL Cholesterol         148 (H)           <130 mg/dL (*  -TIBC  Collection Time: 02/10/21 10:31 AM       Result Value             Ref Range           Iron, Serum                 78                45 - 160 mcg*       Total Iron Binding Cap*     331               250 - 450 mc*       Iron Saturation             24                16 - 45 % (c*  -Comprehensive metabolic panel  Collection Time: 02/10/21 10:31 AM       Result                      Value             Ref Range           Glucose, Random             83                65 - 99 mg/dL       BUN                         16                7 - 25 mg/dL        Creatinine                  0 92              0 50 - 1 05 *       eGFR Non  Ameri*     71                > OR = 60 mL*       eGFR        82                > OR = 60 mL*       SL AMB BUN/CREATININE *                       6 - 22 (calc)   NOT APPLICABLE       Sodium                      139               135 - 146 mm*       Potassium                   4 5               3 5 - 5 3 mm*       Chloride                    102               98 - 110 mmo*       CO2                         26                20 - 32 mmol*       Calcium                     9 7               8 6 - 10 4 m*       Protein, Total              7 4               6 1 - 8 1 g/*       Albumin                     4 5               3 6 - 5 1 g/*       Globulin                    2 9               1 9 - 3 7 g/*       Albumin/Globulin Ratio      1 6               1 0 - 2 5 (c*       TOTAL BILIRUBIN             0 4               0 2 - 1 2 mg*       Alkaline Phosphatase        125               37 - 153 U/L        AST                         19                10 - 35 U/L         ALT                         21                6 - 29 U/L     -Vitamin B6  Collection Time: 02/10/21 10:31 AM       Result                      Value             Ref Range           Vitamin B6                  185 7 (H)         2 1 - 21 7 n*  -Methylmalonic acid, serum  Collection Time: 02/10/21 10:31 AM       Result                      Value             Ref Range Methylmalonic Acid, S       129               87 - 318 nmo*  -CBC and differential  Collection Time: 02/10/21 10:31 AM       Result                      Value             Ref Range           White Blood Cell Count      7 0               3 8 - 10 8 T*       Red Blood Cell Count        4 71              3 80 - 5 10 *       Hemoglobin                  13 4              11 7 - 15 5 *       HCT                         41 6              35 0 - 45 0 %       MCV                         88 3              80 0 - 100 0*       MCH                         28 5              27 0 - 33 0 *       MCHC                        32 2              32 0 - 36 0 *       RDW                         13 5              11 0 - 15 0 %       Platelet Count              433 (H)           140 - 400 Th*       SL AMB MPV                  10 1              7 5 - 12 5 fL       Neutrophils (Absolute)      3,955             1,500 - 7,80*       Lymphocytes (Absolute)      2,317             850 - 3,900 *       Monocytes (Absolute)        532               200 - 950 ce*       Eosinophils (Absolute)      147               15 - 500 patricia*       Basophils ABS               49                0 - 200 cell*       Neutrophils                 56 5              %                   Lymphocytes                 33 1              %                   Monocytes                   7 6               %                   Eosinophils                 2 1               %                   Basophils PCT               0 7               %              -Lyme Total Antibody Profile with reflex to WB  Collection Time: 02/10/21 10:31 AM       Result                      Value             Ref Range           Lyme Ab Screen              0 93 (H)          index               Lyme Disease Ab (IgG),*     NEGATIVE          NEGATIVE            Lyme 18 kD IgG              NON-REACTIVE                          Lyme 23 kD IgG              NON-REACTIVE                          Lyme 28 kD IgG NON-REACTIVE                          Lyme 30 kD IgG              NON-REACTIVE                          Lyme 39 kD IgG              NON-REACTIVE                          Lyme 41 kD IgG              REACTIVE (A)                          Lyme 45 kD IgG              NON-REACTIVE                          Lyme 58 kD IgG              NON-REACTIVE                          Lyme 66 kD IgG              NON-REACTIVE                          Lyme 93 kD IgG              NON-REACTIVE                          Lyme Disease Ab (IgM),*     NEGATIVE          NEGATIVE            Lyme 23 kD IgM              NON-REACTIVE                          Lyme 39 kD IgM              NON-REACTIVE                          Lyme 41 kD IgM              NON-REACTIVE                     -MANUEL Screen w/ Reflex to Titer/Pattern  Collection Time: 02/10/21 10:31 AM       Result                      Value             Ref Range           MANUEL Screen, IFA             NEGATIVE          NEGATIVE       -Ferritin  Collection Time: 02/10/21 10:31 AM       Result                      Value             Ref Range           Ferritin                    133               16 - 232 ng/*  -Folate  Collection Time: 02/10/21 10:31 AM       Result                      Value             Ref Range           FOLATE, SERUM               >24 0             ng/mL          -Vitamin B12  Collection Time: 02/10/21 10:31 AM       Result                      Value             Ref Range           Vitamin B-12                >2,000 (H)        200 - 1,100 *  -TSH, 3rd generation with Free T4 reflex  Collection Time: 02/10/21 10:31 AM       Result                      Value             Ref Range           TSH W/RFX TO FREE T4        0 12 (L)          mIU/L          -Niacin (vitamin B3)  Collection Time: 02/10/21 10:31 AM       Result                      Value             Ref Range           Nicotinic Acid              <20               ng/mL               Nicotinamide 121               ng/mL          -T4, free  Collection Time: 02/10/21 10:31 AM       Result                      Value             Ref Range           Free t4                     1 1               0 8 - 1 8 ng*        The following portions of the patient's history were reviewed and updated as appropriate: allergies, current medications, past family history, past medical history, past social history, past surgical history and problem list       Review of Systems   Constitutional: Negative  HENT: Negative  Eyes: Negative  Respiratory: Negative  Cardiovascular: Negative  Gastrointestinal: Negative  Endocrine: Negative  Genitourinary: Negative  Musculoskeletal: Negative  Skin: Negative  Allergic/Immunologic: Negative  Neurological: Negative  Hematological: Negative  Psychiatric/Behavioral: Negative  Data to review:       Objective:    Vitals:    02/24/21 1146   BP: 120/64   BP Location: Left arm   Patient Position: Sitting   Cuff Size: Large   Pulse: 88   Temp: 98 6 °F (37 °C)   TempSrc: Oral   Weight: 73 5 kg (162 lb)   Height: 5' 5" (1 651 m)        Physical Exam  Vitals signs and nursing note reviewed  Constitutional:       Appearance: Normal appearance  She is well-developed  HENT:      Head: Normocephalic and atraumatic  Eyes:      General: Lids are normal       Conjunctiva/sclera: Conjunctivae normal       Pupils: Pupils are equal, round, and reactive to light  Cardiovascular:      Rate and Rhythm: Normal rate and regular rhythm  Heart sounds: No murmur  Pulmonary:      Effort: Pulmonary effort is normal       Breath sounds: Normal breath sounds  Skin:     General: Skin is warm and dry  Neurological:      General: No focal deficit present  Mental Status: She is alert  Coordination: Coordination is intact  Psychiatric:         Mood and Affect: Mood normal          Behavior: Behavior normal  Behavior is cooperative           Thought Content: Thought content normal          Judgment: Judgment normal            BMI Counseling: Body mass index is 26 96 kg/m²  The BMI is above normal  Nutrition recommendations include decreasing portion sizes, encouraging healthy choices of fruits and vegetables, decreasing fast food intake, consuming healthier snacks, limiting drinks that contain sugar, moderation in carbohydrate intake, increasing intake of lean protein, reducing intake of saturated and trans fat and reducing intake of cholesterol  Exercise recommendations include exercising 3-5 times per week  No pharmacotherapy was ordered

## 2021-02-24 NOTE — PATIENT INSTRUCTIONS
Problem List Items Addressed This Visit        Endocrine    Acquired hypothyroidism       TSH slightly low at 0 12 on 112 mcg daily supplementation  She is already on 6 days a week  At this time I am recommending reducing to 88 mcg daily and recheck in 8 weeks  Relevant Medications    levothyroxine 88 mcg tablet    Other Relevant Orders    TSH, 3rd generation with Free T4 reflex    TSH, 3rd generation with Free T4 reflex       Hematopoietic and Hemostatic    Thrombocytosis (HCC) - Primary       Platelets continue to be ever so slightly elevated at 433  I do not believe this warrants daily aspirin use and patient is no longer on birth control  Check yearly  Relevant Medications    vitamin B-12 (VITAMIN B-12) 500 mcg tablet       Other    Mixed hyperlipidemia       LDL controlled at 108 however triglycerides are significantly elevated at 279 which is above 150  This is however improved from a 362 at the last check  Continue decrease carbohydrate intake including rice potatoes bread and pasta  Pt currently taking pravastatin and we are going to change her to crestor 10 mg to see if her muscle aches and paresthesia resolve or lessen  Relevant Medications    rosuvastatin (CRESTOR) 10 MG tablet    Other Relevant Orders    Lipid Panel with Direct LDL reflex    Comprehensive metabolic panel    Microcytic anemia       Continue iron RBC hemoglobin hematocrit within normal limits  Check yearly           Relevant Medications    vitamin B-12 (VITAMIN B-12) 500 mcg tablet    Pain and numbness of upper extremity

## 2021-02-24 NOTE — ASSESSMENT & PLAN NOTE
Per patient could be a side effect of statin would like to change will try Crestor verses Pravachol and recheck in 6 months

## 2021-03-10 DIAGNOSIS — Z23 ENCOUNTER FOR IMMUNIZATION: ICD-10-CM

## 2022-02-07 LAB
ALBUMIN SERPL-MCNC: 4.5 G/DL (ref 3.6–5.1)
ALBUMIN/GLOB SERPL: 1.7 (CALC) (ref 1–2.5)
ALP SERPL-CCNC: 81 U/L (ref 37–153)
ALT SERPL-CCNC: 22 U/L (ref 6–29)
AST SERPL-CCNC: 20 U/L (ref 10–35)
BILIRUB SERPL-MCNC: 0.5 MG/DL (ref 0.2–1.2)
BUN SERPL-MCNC: 17 MG/DL (ref 7–25)
BUN/CREAT SERPL: NORMAL (CALC) (ref 6–22)
CALCIUM SERPL-MCNC: 9.9 MG/DL (ref 8.6–10.4)
CHLORIDE SERPL-SCNC: 105 MMOL/L (ref 98–110)
CHOLEST SERPL-MCNC: 225 MG/DL
CHOLEST/HDLC SERPL: 1.9 (CALC)
CO2 SERPL-SCNC: 29 MMOL/L (ref 20–32)
CREAT SERPL-MCNC: 0.88 MG/DL (ref 0.5–1.05)
GLOBULIN SER CALC-MCNC: 2.7 G/DL (CALC) (ref 1.9–3.7)
GLUCOSE SERPL-MCNC: 90 MG/DL (ref 65–99)
HDLC SERPL-MCNC: 118 MG/DL
LDLC SERPL CALC-MCNC: 87 MG/DL (CALC)
NONHDLC SERPL-MCNC: 107 MG/DL (CALC)
POTASSIUM SERPL-SCNC: 4.4 MMOL/L (ref 3.5–5.3)
PROT SERPL-MCNC: 7.2 G/DL (ref 6.1–8.1)
SL AMB EGFR AFRICAN AMERICAN: 86 ML/MIN/1.73M2
SL AMB EGFR NON AFRICAN AMERICAN: 74 ML/MIN/1.73M2
SODIUM SERPL-SCNC: 140 MMOL/L (ref 135–146)
TRIGL SERPL-MCNC: 107 MG/DL
TSH SERPL-ACNC: 0.89 MIU/L

## 2022-02-08 NOTE — RESULT ENCOUNTER NOTE
Please let pt know I have the results of her labs if she can please make an OV to review and close care gaps  Thank you

## 2022-02-11 ENCOUNTER — OFFICE VISIT (OUTPATIENT)
Dept: FAMILY MEDICINE CLINIC | Facility: CLINIC | Age: 57
End: 2022-02-11
Payer: COMMERCIAL

## 2022-02-11 VITALS
WEIGHT: 130.2 LBS | TEMPERATURE: 98.3 F | RESPIRATION RATE: 16 BRPM | BODY MASS INDEX: 21.69 KG/M2 | SYSTOLIC BLOOD PRESSURE: 100 MMHG | DIASTOLIC BLOOD PRESSURE: 68 MMHG | HEIGHT: 65 IN | HEART RATE: 69 BPM

## 2022-02-11 DIAGNOSIS — D75.839 THROMBOCYTOSIS: ICD-10-CM

## 2022-02-11 DIAGNOSIS — Z12.31 ENCOUNTER FOR SCREENING MAMMOGRAM FOR MALIGNANT NEOPLASM OF BREAST: ICD-10-CM

## 2022-02-11 DIAGNOSIS — E03.9 ACQUIRED HYPOTHYROIDISM: Primary | ICD-10-CM

## 2022-02-11 DIAGNOSIS — E78.2 MIXED HYPERLIPIDEMIA: ICD-10-CM

## 2022-02-11 DIAGNOSIS — D50.9 MICROCYTIC ANEMIA: ICD-10-CM

## 2022-02-11 DIAGNOSIS — L30.9 ECZEMA, UNSPECIFIED TYPE: ICD-10-CM

## 2022-02-11 DIAGNOSIS — Z12.11 SCREENING FOR COLON CANCER: ICD-10-CM

## 2022-02-11 PROCEDURE — 3008F BODY MASS INDEX DOCD: CPT | Performed by: PHYSICIAN ASSISTANT

## 2022-02-11 PROCEDURE — 99214 OFFICE O/P EST MOD 30 MIN: CPT | Performed by: PHYSICIAN ASSISTANT

## 2022-02-11 PROCEDURE — 3725F SCREEN DEPRESSION PERFORMED: CPT | Performed by: PHYSICIAN ASSISTANT

## 2022-02-11 PROCEDURE — 1036F TOBACCO NON-USER: CPT | Performed by: PHYSICIAN ASSISTANT

## 2022-02-11 RX ORDER — ROSUVASTATIN CALCIUM 10 MG/1
10 TABLET, COATED ORAL DAILY
Qty: 90 TABLET | Refills: 3 | Status: SHIPPED | OUTPATIENT
Start: 2022-02-11 | End: 2022-06-27 | Stop reason: SDUPTHER

## 2022-02-11 RX ORDER — LEVOTHYROXINE SODIUM 88 UG/1
88 TABLET ORAL DAILY
Qty: 90 TABLET | Refills: 3 | Status: SHIPPED | OUTPATIENT
Start: 2022-02-11 | End: 2022-06-27 | Stop reason: SDUPTHER

## 2022-02-11 RX ORDER — HALOBETASOL PROPIONATE 0.05 %
OINTMENT (GRAM) TOPICAL 2 TIMES DAILY
Qty: 50 G | Refills: 1 | Status: SHIPPED | OUTPATIENT
Start: 2022-02-11 | End: 2022-02-13

## 2022-02-11 RX ORDER — CHLORAL HYDRATE 500 MG
CAPSULE ORAL
COMMUNITY
Start: 2022-01-15

## 2022-02-11 NOTE — PROGRESS NOTES
Assessment and Plan:    Problem List Items Addressed This Visit        Endocrine    Acquired hypothyroidism - Primary     TSH within normal limits  Relevant Medications    levothyroxine 88 mcg tablet    Other Relevant Orders    TSH, 3rd generation with Free T4 reflex       Musculoskeletal and Integument    Eczema     Out of a very old steroid cream so refills given  Relevant Medications    halobetasol (ULTRAVATE) 0 05 % ointment       Hematopoietic and Hemostatic    Thrombocytosis     Check CBC with next labs  Relevant Orders    CBC and differential       Other    Mixed hyperlipidemia     Patient is on Crestor 10 mg once daily keeping her LDL at goal at 87 and her HDL is astronomically amazing at 118 this time  Continue check 6 months  Relevant Medications    rosuvastatin (CRESTOR) 10 MG tablet    Other Relevant Orders    Comprehensive metabolic panel    Lipid Panel with Direct LDL reflex    Microcytic anemia     Check CBC with next labs  Relevant Orders    CBC and differential      Other Visit Diagnoses     Screening for colon cancer        Relevant Orders    Cologuard    Encounter for screening mammogram for malignant neoplasm of breast        Relevant Orders    Mammo screening bilateral w 3d & cad                 Diagnoses and all orders for this visit:    Acquired hypothyroidism  -     TSH, 3rd generation with Free T4 reflex; Future  -     levothyroxine 88 mcg tablet; Take 1 tablet (88 mcg total) by mouth daily    Thrombocytosis  -     CBC and differential; Future    Microcytic anemia  -     CBC and differential; Future    Mixed hyperlipidemia  -     Comprehensive metabolic panel; Future  -     Lipid Panel with Direct LDL reflex; Future  -     rosuvastatin (CRESTOR) 10 MG tablet;  Take 1 tablet (10 mg total) by mouth daily    Screening for colon cancer  -     Cologuard    Encounter for screening mammogram for malignant neoplasm of breast  -     Mammo screening bilateral w 3d & cad; Future    Eczema, unspecified type  -     halobetasol (ULTRAVATE) 0 05 % ointment; Apply topically 2 (two) times a day    Other orders  -     Omega-3 Fatty Acids (fish oil) 1,000 mg              Subjective:      Patient ID: Torsten Ham is a 55 y o  female  CC:    Chief Complaint   Patient presents with    Follow-up     Patient here for follow up       HPI:      Torsten Ham is here for chronic conditions f/u including the diagnosis of Acquired hypothyroidism  (primary encounter diagnosis)  Thrombocytosis  Microcytic anemia  Mixed hyperlipidemia  Screening for colon cancer  Encounter for screening mammogram for malignant neoplasm of breast   Pt  states they are taking all medications as directed without complaints or side effects   Pt  had labs done prior to today's visit which included Recent Results (from the past 672 hour(s))  -Lipid Panel with Direct LDL reflex:   Collection Time: 02/07/22  9:42 AM       Result                      Value             Ref Range           Total Cholesterol           225 (H)           <200 mg/dL          HDL                         118               > OR = 50 mg*       Triglycerides               107               <150 mg/dL          LDL Calculated              87                mg/dL (calc)        Chol HDLC Ratio             1 9               <5 0 (calc)         Non-HDL Cholesterol         107               <130 mg/dL (*  -Comprehensive metabolic panel:   Collection Time: 02/07/22  9:42 AM       Result                      Value             Ref Range           Glucose, Random             90                65 - 99 mg/dL       BUN                         17                7 - 25 mg/dL        Creatinine                  0 88              0 50 - 1 05 *       eGFR Non  Ameri*     74                > OR = 60 mL*       eGFR        86                > OR = 60 mL*       SL AMB BUN/CREATININE *                       6 - 22 (calc)   NOT APPLICABLE       Sodium                      140               135 - 146 mm*       Potassium                   4 4               3 5 - 5 3 mm*       Chloride                    105               98 - 110 mmo*       CO2                         29                20 - 32 mmol*       Calcium                     9 9               8 6 - 10 4 m*       Protein, Total              7 2               6 1 - 8 1 g/*       Albumin                     4 5               3 6 - 5 1 g/*       Globulin                    2 7               1 9 - 3 7 g/*       Albumin/Globulin Ratio      1 7               1 0 - 2 5 (c*       TOTAL BILIRUBIN             0 5               0 2 - 1 2 mg*       Alkaline Phosphatase        81                37 - 153 U/L        AST                         20                10 - 35 U/L         ALT                         22                6 - 29 U/L     -TSH, 3rd generation with Free T4 reflex:   Collection Time: 02/07/22  9:42 AM       Result                      Value             Ref Range           TSH W/RFX TO FREE T4        0 89              mIU/L                The following portions of the patient's history were reviewed and updated as appropriate: allergies, current medications, past family history, past medical history, past social history, past surgical history and problem list       Review of Systems   Constitutional: Negative  Negative for chills and fever  HENT: Negative  Negative for ear pain and sore throat  Eyes: Negative  Negative for pain and visual disturbance  Respiratory: Negative  Negative for cough and shortness of breath  Cardiovascular: Negative  Negative for chest pain and palpitations  Gastrointestinal: Negative  Negative for abdominal pain and vomiting  Endocrine: Negative  Genitourinary: Negative  Negative for dysuria and hematuria  Musculoskeletal: Negative  Negative for arthralgias and back pain  Skin: Negative  Negative for color change and rash  Allergic/Immunologic: Negative  Neurological: Negative  Negative for seizures and syncope  Hematological: Negative  Psychiatric/Behavioral: Negative  All other systems reviewed and are negative  Data to review:       Objective:    Vitals:    02/11/22 1002   BP: 100/68   BP Location: Right arm   Patient Position: Sitting   Cuff Size: Adult   Pulse: 69   Resp: 16   Temp: 98 3 °F (36 8 °C)   TempSrc: Temporal   Weight: 59 1 kg (130 lb 3 2 oz)   Height: 5' 5" (1 651 m)        Physical Exam  Vitals and nursing note reviewed  Constitutional:       Appearance: Normal appearance  She is well-developed  HENT:      Head: Normocephalic and atraumatic  Eyes:      General: Lids are normal       Conjunctiva/sclera: Conjunctivae normal       Pupils: Pupils are equal, round, and reactive to light  Cardiovascular:      Rate and Rhythm: Normal rate and regular rhythm  Heart sounds: No murmur heard  Pulmonary:      Effort: Pulmonary effort is normal       Breath sounds: Normal breath sounds  Skin:     General: Skin is warm and dry  Neurological:      General: No focal deficit present  Mental Status: She is alert  Coordination: Coordination is intact  Psychiatric:         Mood and Affect: Mood normal          Behavior: Behavior normal  Behavior is cooperative  Thought Content: Thought content normal          Judgment: Judgment normal              Depression Screening and Follow-up Plan: Patient was screened for depression during today's encounter  They screened negative with a PHQ-2 score of 0

## 2022-02-11 NOTE — ASSESSMENT & PLAN NOTE
Patient is on Crestor 10 mg once daily keeping her LDL at goal at 87 and her HDL is astronomically amazing at 118 this time  Continue check 6 months

## 2022-02-12 ENCOUNTER — PATIENT MESSAGE (OUTPATIENT)
Dept: FAMILY MEDICINE CLINIC | Facility: CLINIC | Age: 57
End: 2022-02-12

## 2022-02-13 DIAGNOSIS — L30.9 ECZEMA, UNSPECIFIED TYPE: Primary | ICD-10-CM

## 2022-02-13 RX ORDER — TRIAMCINOLONE ACETONIDE 0.25 MG/G
OINTMENT TOPICAL 2 TIMES DAILY
Qty: 30 G | Refills: 0 | Status: SHIPPED | OUTPATIENT
Start: 2022-02-13

## 2022-03-26 LAB — COLOGUARD RESULT REPORTABLE: POSITIVE

## 2022-03-27 PROBLEM — R19.5 POSITIVE COLORECTAL CANCER SCREENING USING COLOGUARD TEST: Status: ACTIVE | Noted: 2022-03-27

## 2022-03-27 NOTE — RESULT ENCOUNTER NOTE
Please let pt know that her cologuard is positive and therefore I need her to see GI for evaluation and likely colonoscopy +/- EGD  It is very important that she follow up with this specialist  Taj Ro is placed

## 2022-03-28 DIAGNOSIS — F41.8 TEST ANXIETY: Primary | ICD-10-CM

## 2022-03-28 RX ORDER — ALPRAZOLAM 0.25 MG/1
0.25 TABLET ORAL DAILY PRN
Qty: 15 TABLET | Refills: 0 | Status: SHIPPED | OUTPATIENT
Start: 2022-03-28

## 2022-03-31 ENCOUNTER — OFFICE VISIT (OUTPATIENT)
Dept: GASTROENTEROLOGY | Facility: CLINIC | Age: 57
End: 2022-03-31
Payer: COMMERCIAL

## 2022-03-31 VITALS
WEIGHT: 136 LBS | BODY MASS INDEX: 22.66 KG/M2 | OXYGEN SATURATION: 98 % | HEIGHT: 65 IN | HEART RATE: 70 BPM | TEMPERATURE: 97.7 F | DIASTOLIC BLOOD PRESSURE: 66 MMHG | RESPIRATION RATE: 18 BRPM | SYSTOLIC BLOOD PRESSURE: 112 MMHG

## 2022-03-31 DIAGNOSIS — D50.9 MICROCYTIC ANEMIA: ICD-10-CM

## 2022-03-31 DIAGNOSIS — R19.5 POSITIVE COLORECTAL CANCER SCREENING USING COLOGUARD TEST: ICD-10-CM

## 2022-03-31 PROCEDURE — 99204 OFFICE O/P NEW MOD 45 MIN: CPT | Performed by: PHYSICIAN ASSISTANT

## 2022-03-31 PROCEDURE — 3008F BODY MASS INDEX DOCD: CPT | Performed by: PHYSICIAN ASSISTANT

## 2022-03-31 RX ORDER — POLYETHYLENE GLYCOL 3350 17 G/17G
POWDER, FOR SOLUTION ORAL
Qty: 238 G | Refills: 0 | Status: SHIPPED | OUTPATIENT
Start: 2022-03-31

## 2022-03-31 NOTE — PROGRESS NOTES
[facility] - Outpatient Consultation  Octavio Lal 55 y o  female MRN: [id number]  Encounter: [id number]        Assessment and Plan    1  Positive cologuard  The patient had a positive Cologuard test   She denies any overt GI bleeding or any GI complaints/alarm symptoms  She has no family history of colon cancer  No prior colonoscopy  She does have a history of microcytic iron deficiency anemia  She is currently on oral iron supplementation  Her last hemoglobin and ferritin were 1 year ago and were within normal limits  Repeat labs are ordered but not yet obtained by the patient  - obtain CBC and ferritin  - discussed both EGD and colonoscopy with the patient, at this time she would like to proceed with colonoscopy and will consider the addition of the upper endoscopy, risks including bleeding, infection, bowel perforation, and missed polyp were reviewed  - MiraLax/Dulcolax bowel prep instructions reviewed and provided    Follow-up as needed after colonoscopy    ______________________________________________________________________    History of Present Illness  Octavio Lal is a 55 y o  female here for consultation of positive Cologuard test   The patient denies any overt GI bleeding  She states that she has no GI complaints or alarm symptoms  She has never had a prior colonoscopy  She has no family history of colon cancer  She does have a history of microcytic iron deficiency anemia  She is currently on iron supplementations with her last HGB and ferritin normal 1 year ago  Repeat labs are ordered but not yet obtained by patient  Review of Systems   Constitutional: Negative for activity change, appetite change, chills, fatigue, fever and unexpected weight change  HENT: Negative for sore throat and trouble swallowing  Respiratory: Negative for cough and choking      Gastrointestinal: Negative for abdominal distention, abdominal pain, anal bleeding, blood in stool, constipation, diarrhea, nausea, rectal pain and vomiting  Musculoskeletal: Negative for back pain and gait problem  Psychiatric/Behavioral: Negative for confusion         Past Medical History  Past Medical History:   Diagnosis Date    Hyponatremia     last assessed: 7/14/2016       Past Social history  Past Surgical History:   Procedure Laterality Date    RHINOPLASTY      7years old     Social History     Socioeconomic History    Marital status: Legally      Spouse name: Not on file    Number of children: Not on file    Years of education: Not on file    Highest education level: Not on file   Occupational History    Not on file   Tobacco Use    Smoking status: Never Smoker    Smokeless tobacco: Never Used   Substance and Sexual Activity    Alcohol use: Yes     Comment: rarely    Drug use: No    Sexual activity: Not on file   Other Topics Concern    Not on file   Social History Narrative    Not on file     Social Determinants of Health     Financial Resource Strain: Not on file   Food Insecurity: Not on file   Transportation Needs: Not on file   Physical Activity: Not on file   Stress: Not on file   Social Connections: Not on file   Intimate Partner Violence: Not on file   Housing Stability: Not on file     Social History     Substance and Sexual Activity   Alcohol Use Yes    Comment: rarely     Social History     Substance and Sexual Activity   Drug Use No     Social History     Tobacco Use   Smoking Status Never Smoker   Smokeless Tobacco Never Used       Past Family History  Family History   Problem Relation Age of Onset    Hyperlipidemia Mother     Hyperlipidemia Father     Other Other         cardoiac disorder    Diabetes Other         mellitus    Diabetes Other         mellitus    Prostate cancer Other        Current Medications  Current Outpatient Medications   Medication Sig Dispense Refill    ALPRAZolam (XANAX) 0 25 mg tablet Take 1 tablet (0 25 mg total) by mouth daily as needed for anxiety 15 tablet 0    b complex vitamins tablet Take 1 tablet by mouth daily      Ferrous Sulfate (IRON) 325 (65 Fe) MG TABS Take 1 tablet by mouth 2 (two) times a day      levothyroxine 88 mcg tablet Take 1 tablet (88 mcg total) by mouth daily 90 tablet 3    Omega-3 Fatty Acids (fish oil) 1,000 mg       rosuvastatin (CRESTOR) 10 MG tablet Take 1 tablet (10 mg total) by mouth daily 90 tablet 3    triamcinolone (KENALOG) 0 025 % ointment Apply topically 2 (two) times a day 30 g 0    vitamin B-12 (VITAMIN B-12) 500 mcg tablet Take 500 mcg by mouth daily       No current facility-administered medications for this visit  Allergies  No Known Allergies      The following portions of the patient's history were reviewed and updated as appropriate: allergies, current medications, past medical history, past social history, past surgical history and problem list       Vitals  Vitals:    03/31/22 1059   BP: 112/66   BP Location: Right arm   Patient Position: Sitting   Cuff Size: Adult   Pulse: 70   Resp: 18   Temp: 97 7 °F (36 5 °C)   TempSrc: Tympanic   SpO2: 98%   Weight: 61 7 kg (136 lb)   Height: 5' 5" (1 651 m)         Physical Exam  Constitutional   General appearance: Patient is seated and in no acute distress, well appearing and well nourished  Head and Face   Head and face: Normal     Eyes   Conjunctiva and lids: No erythema, swelling or discharge  Anicteric  Ears, Nose, Mouth, and Throat   Hearing: Normal     Neck: Supple, trachea midline  Pulmonary   Respiratory effort: No increased work of breathing or signs of respiratory distress  Lungs: Clear to ascultation, no wheezes, rhonchi, or rales  Cardiovascular   Heart: Regular rate and rhythm, no murmurs gallops or rubs   Examination of extremities for edema and/or varicosities: Normal     Abdomen   Abdomen: Soft, non-tender, no masses, no organomegaly  Normal bowel sounds     Musculoskeletal   Gait and station: Normal  Skin   Skin and subcutaneous tissue: Warm, dry, and intact  No visible jaundice, lesions or rashes  Psychiatric   Judgment and insight: Normal  Recent and remote memory:  Normal  Mood and affect: Normal      Results  No visits with results within 1 Day(s) from this visit  Latest known visit with results is:   Office Visit on 02/11/2022   Component Date Value    Cologuard Result 03/22/2022 Positive*       Radiology Results  No results found  Orders  No orders of the defined types were placed in this encounter

## 2022-03-31 NOTE — PATIENT INSTRUCTIONS
Offered sooner appt pt declined due to transportation issues        Scheduled date of colonoscopy (as of today): 6/15/2022  Physician performing colonoscopy: Dr Rivera  [place]  Bowel prep reviewed with patient: Miralax/Dulcolax  Instructions reviewed with patient by: [facility]  Clearances:N/A

## 2022-06-01 ENCOUNTER — NURSE TRIAGE (OUTPATIENT)
Dept: OTHER | Facility: OTHER | Age: 57
End: 2022-06-01

## 2022-06-01 NOTE — TELEPHONE ENCOUNTER
Hi Dr Bronwyn Jones, you will be performing colonoscopy in 2 weeks for + Cologuard and history of anemia  Nancy Juan states she has neuropathy which is managed with oral iron and she gets severe nerve pain when she stops taking the iron  I explained our reasoning for holding the iron prior to colonoscopy, but she is afraid of being in pain  I have never run into this issue before  Do you think I should tell her she can continue the iron supplement but if her colonoscopy prep is inadequate, she may need to repeat? Or should we give her a 2 day prep instead?
Patient called in to question stopping her Iron tablets for one week prior to her colonoscopy scheduled for 6/15/22  Patient reports she is anemic and the iron supplements daily keep her pain free and she is concerned about stopping for one week and the repercussion  PCP advised patient to follow up with Gi for discussion and resolution  Please follow up with patient  Reason for Disposition   Nursing judgment    Answer Assessment - Initial Assessment Questions  1  REASON FOR CALL or QUESTION: "What is your reason for calling today?" or "How can I best help you?" or "What question do you have that I can help answer?"      Patient reports being anemic and requires Iron supplement  Takes 2-2 5 tablets per day  What to do?     Protocols used: INFORMATION ONLY CALL - NO TRIAGE-ADULT-OH
Please relay Dr Danyelle Amado recommendation to the patient  If she absolutely cannot stop the iron due to neuropathic pain, please make her aware that her colon may not be fully cleaned out and we could miss polyps/lesions  If she plans to continue the iron, please give her 2 day Golytely prep instructions and let me know, so I can enter the prescription 
Regarding: colonoscopy instructions  ----- Message from [facility] sent at 6/1/2022 10:39 AM EDT -----  "I would like to review instructions before my upcoming colonoscopy "
Review prep options pending if pt continues with iron supplement or not  Pt stated she will review both prep instructions and call office with decision, 2 day prep instructions emailed to pt   Will await call back
room air

## 2022-06-13 ENCOUNTER — TELEPHONE (OUTPATIENT)
Dept: GASTROENTEROLOGY | Facility: AMBULARY SURGERY CENTER | Age: 57
End: 2022-06-13

## 2022-06-13 NOTE — TELEPHONE ENCOUNTER
Patients GI provider:  Dr Claudean Bears     Number to return call: [phone]    Reason for call: Pt calling to cancel her procedure due to insurance   Will call back to reschedule at a later time     Scheduled procedure/appointment date if applicable: Apt/procedure    6/15/22

## 2022-06-14 ENCOUNTER — TELEPHONE (OUTPATIENT)
Dept: GASTROENTEROLOGY | Facility: CLINIC | Age: 57
End: 2022-06-14

## 2022-06-14 NOTE — TELEPHONE ENCOUNTER
Received a VM from pt yesterday explaining her insurance was cancelled last month  Pt wants to cancel EGD until insurance issues are fixed  EGd already cancelled

## 2022-08-08 ENCOUNTER — TELEPHONE (OUTPATIENT)
Dept: GASTROENTEROLOGY | Facility: CLINIC | Age: 57
End: 2022-08-08

## 2022-08-08 NOTE — TELEPHONE ENCOUNTER
Patients GI provider:  Dr Mejia Soto    Number to return call: [phone]    Reason for call: Pt calling to reschedule her cxl procedure from June    Scheduled procedure/appointment date if applicable: N/A

## 2022-08-09 NOTE — TELEPHONE ENCOUNTER
Colon rescheduled to 9/16/22 with Dr Mehreen Wolf at [facility]

## 2022-09-15 ENCOUNTER — ANESTHESIA (OUTPATIENT)
Dept: ANESTHESIOLOGY | Facility: HOSPITAL | Age: 57
End: 2022-09-15

## 2022-09-15 ENCOUNTER — ANESTHESIA EVENT (OUTPATIENT)
Dept: ANESTHESIOLOGY | Facility: HOSPITAL | Age: 57
End: 2022-09-15

## 2022-09-15 RX ORDER — SODIUM CHLORIDE 9 MG/ML
125 INJECTION, SOLUTION INTRAVENOUS CONTINUOUS
Status: CANCELLED | OUTPATIENT
Start: 2022-09-15

## 2022-09-15 NOTE — ANESTHESIA PREPROCEDURE EVALUATION
Procedure:  PRE-OP ONLY    Relevant Problems   ANESTHESIA (within normal limits)      CARDIO   (+) Mixed hyperlipidemia      ENDO   (+) Acquired hypothyroidism      GI/HEPATIC (within normal limits)      /RENAL (within normal limits)      GYN (within normal limits)      HEMATOLOGY   (+) Microcytic anemia      MUSCULOSKELETAL (within normal limits)      NEURO/PSYCH (within normal limits)      PULMONARY (within normal limits)             Anesthesia Plan  ASA Score- 2     Anesthesia Type- IV sedation with anesthesia with ASA Monitors  Additional Monitors:   Airway Plan:           Plan Factors-Exercise tolerance (METS): >4 METS  Chart reviewed  Patient summary reviewed  Patient is not a current smoker  Induction- intravenous  Postoperative Plan-     Informed Consent- Anesthetic plan and risks discussed with patient

## 2022-09-16 ENCOUNTER — ANESTHESIA EVENT (OUTPATIENT)
Dept: GASTROENTEROLOGY | Facility: MEDICAL CENTER | Age: 57
End: 2022-09-16

## 2022-09-16 ENCOUNTER — HOSPITAL ENCOUNTER (OUTPATIENT)
Dept: GASTROENTEROLOGY | Facility: MEDICAL CENTER | Age: 57
Setting detail: OUTPATIENT SURGERY
End: 2022-09-16
Payer: COMMERCIAL

## 2022-09-16 ENCOUNTER — ANESTHESIA (OUTPATIENT)
Dept: GASTROENTEROLOGY | Facility: MEDICAL CENTER | Age: 57
End: 2022-09-16

## 2022-09-16 VITALS
TEMPERATURE: 97.8 F | WEIGHT: 136 LBS | HEIGHT: 65 IN | OXYGEN SATURATION: 100 % | RESPIRATION RATE: 16 BRPM | SYSTOLIC BLOOD PRESSURE: 117 MMHG | DIASTOLIC BLOOD PRESSURE: 58 MMHG | HEART RATE: 68 BPM | BODY MASS INDEX: 22.66 KG/M2

## 2022-09-16 DIAGNOSIS — R19.5 POSITIVE COLORECTAL CANCER SCREENING USING COLOGUARD TEST: ICD-10-CM

## 2022-09-16 LAB
EXT PREGNANCY TEST URINE: NEGATIVE
EXT. CONTROL: NORMAL

## 2022-09-16 PROCEDURE — 88305 TISSUE EXAM BY PATHOLOGIST: CPT | Performed by: SPECIALIST

## 2022-09-16 PROCEDURE — 45385 COLONOSCOPY W/LESION REMOVAL: CPT | Performed by: INTERNAL MEDICINE

## 2022-09-16 PROCEDURE — 81025 URINE PREGNANCY TEST: CPT | Performed by: ANESTHESIOLOGY

## 2022-09-16 RX ORDER — LIDOCAINE HYDROCHLORIDE 20 MG/ML
INJECTION, SOLUTION EPIDURAL; INFILTRATION; INTRACAUDAL; PERINEURAL AS NEEDED
Status: DISCONTINUED | OUTPATIENT
Start: 2022-09-16 | End: 2022-09-16

## 2022-09-16 RX ORDER — SODIUM CHLORIDE 9 MG/ML
125 INJECTION, SOLUTION INTRAVENOUS CONTINUOUS
Status: DISCONTINUED | OUTPATIENT
Start: 2022-09-16 | End: 2022-09-20 | Stop reason: HOSPADM

## 2022-09-16 RX ORDER — PROPOFOL 10 MG/ML
INJECTION, EMULSION INTRAVENOUS AS NEEDED
Status: DISCONTINUED | OUTPATIENT
Start: 2022-09-16 | End: 2022-09-16

## 2022-09-16 RX ADMIN — PROPOFOL 50 MG: 10 INJECTION, EMULSION INTRAVENOUS at 15:03

## 2022-09-16 RX ADMIN — PROPOFOL 50 MG: 10 INJECTION, EMULSION INTRAVENOUS at 15:10

## 2022-09-16 RX ADMIN — PROPOFOL 50 MG: 10 INJECTION, EMULSION INTRAVENOUS at 14:59

## 2022-09-16 RX ADMIN — PROPOFOL 50 MG: 10 INJECTION, EMULSION INTRAVENOUS at 15:15

## 2022-09-16 RX ADMIN — LIDOCAINE HYDROCHLORIDE 80 MG: 20 INJECTION, SOLUTION EPIDURAL; INFILTRATION; INTRACAUDAL; PERINEURAL at 14:52

## 2022-09-16 RX ADMIN — PROPOFOL 50 MG: 10 INJECTION, EMULSION INTRAVENOUS at 14:55

## 2022-09-16 RX ADMIN — PROPOFOL 100 MG: 10 INJECTION, EMULSION INTRAVENOUS at 14:52

## 2022-09-16 RX ADMIN — SODIUM CHLORIDE 125 ML/HR: 0.9 INJECTION, SOLUTION INTRAVENOUS at 14:43

## 2022-09-16 NOTE — ANESTHESIA POSTPROCEDURE EVALUATION
Post-Op Assessment Note    CV Status:  Stable    Pain management: adequate     Mental Status:  Alert and awake   Hydration Status:  Euvolemic   PONV Controlled:  Controlled   Airway Patency:  Patent      Post Op Vitals Reviewed: Yes      Staff: Anesthesiologist         No complications documented      /58 (09/16/22 1535)    Temp      Pulse 68 (09/16/22 1535)   Resp 16 (09/16/22 1535)    SpO2 100 % (09/16/22 1535)

## 2022-09-16 NOTE — H&P
History and Physical - [facility]  Marion Middleton 65 y o  female MRN: [id number]                  HPI: Marion Middleton is a 65y o  year old female who presents for colonoscopy      REVIEW OF SYSTEMS: Per the HPI, and otherwise unremarkable  Historical Information   Past Medical History:   Diagnosis Date    Hyponatremia     last assessed: 7/14/2016     Past Surgical History:   Procedure Laterality Date    RHINOPLASTY      7years old     Social History   Social History     Substance and Sexual Activity   Alcohol Use Yes    Comment: rarely     Social History     Substance and Sexual Activity   Drug Use No     Social History     Tobacco Use   Smoking Status Never Smoker   Smokeless Tobacco Never Used     Family History   Problem Relation Age of Onset    Hyperlipidemia Mother     Hyperlipidemia Father     Other Other         cardoiac disorder    Diabetes Other         mellitus    Diabetes Other         mellitus    Prostate cancer Other        Meds/Allergies     (Not in a hospital admission)      No Known Allergies    Objective     There were no vitals taken for this visit        PHYSICAL EXAM    Gen: NAD  CV: RRR  CHEST: Clear  ABD: soft, NT/ND  EXT: no edema      ASSESSMENT/PLAN:  This is a 65y o  year old female here for colonoscopy    PLAN:   Procedure: colonoscopy

## 2022-09-16 NOTE — ANESTHESIA PREPROCEDURE EVALUATION
Procedure:  COLONOSCOPY    Relevant Problems   ANESTHESIA (within normal limits)      CARDIO   (+) Mixed hyperlipidemia      ENDO   (+) Acquired hypothyroidism      GI/HEPATIC (within normal limits)      /RENAL (within normal limits)      GYN (within normal limits)      HEMATOLOGY   (+) Microcytic anemia      MUSCULOSKELETAL (within normal limits)      NEURO/PSYCH (within normal limits)      PULMONARY (within normal limits)        Physical Exam    Airway    Mallampati score: II  TM Distance: >3 FB  Neck ROM: full     Dental   No notable dental hx     Cardiovascular  Rhythm: regular, Rate: normal, Cardiovascular exam normal    Pulmonary  Pulmonary exam normal     Other Findings        Anesthesia Plan  ASA Score- 2     Anesthesia Type- IV sedation with anesthesia with ASA Monitors  Additional Monitors:   Airway Plan:           Plan Factors-Exercise tolerance (METS): >4 METS  Chart reviewed  Patient summary reviewed  Patient is not a current smoker  Induction- intravenous  Postoperative Plan-     Informed Consent- Anesthetic plan and risks discussed with patient

## 2022-09-18 PROBLEM — R19.5 POSITIVE COLORECTAL CANCER SCREENING USING COLOGUARD TEST: Status: RESOLVED | Noted: 2022-03-27 | Resolved: 2022-09-18

## 2022-09-18 PROBLEM — K63.5 COLON POLYPS: Status: ACTIVE | Noted: 2022-09-18

## 2022-09-23 DIAGNOSIS — Z12.11 SCREENING FOR COLON CANCER: Primary | ICD-10-CM

## 2022-10-03 DIAGNOSIS — E03.9 ACQUIRED HYPOTHYROIDISM: ICD-10-CM

## 2022-10-03 DIAGNOSIS — E78.2 MIXED HYPERLIPIDEMIA: ICD-10-CM

## 2022-10-03 RX ORDER — LEVOTHYROXINE SODIUM 88 UG/1
88 TABLET ORAL DAILY
Qty: 90 TABLET | Refills: 0 | OUTPATIENT
Start: 2022-10-03

## 2022-10-03 RX ORDER — ROSUVASTATIN CALCIUM 10 MG/1
10 TABLET, COATED ORAL DAILY
Qty: 90 TABLET | Refills: 0 | OUTPATIENT
Start: 2022-10-03

## 2022-10-03 NOTE — TELEPHONE ENCOUNTER
Pt is overdue for her labs that were supposed to be done in August  Can have 30/0 and then needs labs and OV until more  Please call pt

## 2022-10-04 DIAGNOSIS — E78.2 MIXED HYPERLIPIDEMIA: ICD-10-CM

## 2022-10-04 DIAGNOSIS — E03.9 ACQUIRED HYPOTHYROIDISM: ICD-10-CM

## 2022-10-04 RX ORDER — LEVOTHYROXINE SODIUM 88 UG/1
88 TABLET ORAL DAILY
Qty: 90 TABLET | Refills: 0 | Status: SHIPPED | OUTPATIENT
Start: 2022-10-04

## 2022-10-04 RX ORDER — ROSUVASTATIN CALCIUM 10 MG/1
10 TABLET, COATED ORAL DAILY
Qty: 90 TABLET | Refills: 0 | Status: SHIPPED | OUTPATIENT
Start: 2022-10-04

## 2022-12-07 ENCOUNTER — TELEPHONE (OUTPATIENT)
Dept: FAMILY MEDICINE CLINIC | Facility: CLINIC | Age: 57
End: 2022-12-07

## 2022-12-07 LAB
ALBUMIN SERPL-MCNC: 4.6 G/DL (ref 3.6–5.1)
ALBUMIN/GLOB SERPL: 1.9 (CALC) (ref 1–2.5)
ALP SERPL-CCNC: 69 U/L (ref 37–153)
ALT SERPL-CCNC: 17 U/L (ref 6–29)
AST SERPL-CCNC: 17 U/L (ref 10–35)
BASOPHILS # BLD AUTO: 59 CELLS/UL (ref 0–200)
BASOPHILS NFR BLD AUTO: 1 %
BILIRUB SERPL-MCNC: 0.4 MG/DL (ref 0.2–1.2)
BUN SERPL-MCNC: 19 MG/DL (ref 7–25)
BUN/CREAT SERPL: NORMAL (CALC) (ref 6–22)
CALCIUM SERPL-MCNC: 9.7 MG/DL (ref 8.6–10.4)
CHLORIDE SERPL-SCNC: 105 MMOL/L (ref 98–110)
CHOLEST SERPL-MCNC: 209 MG/DL
CHOLEST/HDLC SERPL: 1.8 (CALC)
CO2 SERPL-SCNC: 27 MMOL/L (ref 20–32)
CREAT SERPL-MCNC: 0.8 MG/DL (ref 0.5–1.03)
EOSINOPHIL # BLD AUTO: 183 CELLS/UL (ref 15–500)
EOSINOPHIL NFR BLD AUTO: 3.1 %
ERYTHROCYTE [DISTWIDTH] IN BLOOD BY AUTOMATED COUNT: 12.3 % (ref 11–15)
GFR/BSA.PRED SERPLBLD CYS-BASED-ARV: 86 ML/MIN/1.73M2
GLOBULIN SER CALC-MCNC: 2.4 G/DL (CALC) (ref 1.9–3.7)
GLUCOSE SERPL-MCNC: 92 MG/DL (ref 65–99)
HCT VFR BLD AUTO: 39.2 % (ref 35–45)
HDLC SERPL-MCNC: 116 MG/DL
HGB BLD-MCNC: 13 G/DL (ref 11.7–15.5)
LDLC SERPL CALC-MCNC: 77 MG/DL (CALC)
LYMPHOCYTES # BLD AUTO: 1800 CELLS/UL (ref 850–3900)
LYMPHOCYTES NFR BLD AUTO: 30.5 %
MCH RBC QN AUTO: 28.8 PG (ref 27–33)
MCHC RBC AUTO-ENTMCNC: 33.2 G/DL (ref 32–36)
MCV RBC AUTO: 86.7 FL (ref 80–100)
MONOCYTES # BLD AUTO: 513 CELLS/UL (ref 200–950)
MONOCYTES NFR BLD AUTO: 8.7 %
NEUTROPHILS # BLD AUTO: 3345 CELLS/UL (ref 1500–7800)
NEUTROPHILS NFR BLD AUTO: 56.7 %
NONHDLC SERPL-MCNC: 93 MG/DL (CALC)
PLATELET # BLD AUTO: 350 THOUSAND/UL (ref 140–400)
PMV BLD REES-ECKER: 10.1 FL (ref 7.5–12.5)
POTASSIUM SERPL-SCNC: 4.5 MMOL/L (ref 3.5–5.3)
PROT SERPL-MCNC: 7 G/DL (ref 6.1–8.1)
RBC # BLD AUTO: 4.52 MILLION/UL (ref 3.8–5.1)
SODIUM SERPL-SCNC: 139 MMOL/L (ref 135–146)
T4 FREE SERPL-MCNC: 1.5 NG/DL (ref 0.8–1.8)
TRIGL SERPL-MCNC: 80 MG/DL
TSH SERPL-ACNC: 0.27 MIU/L (ref 0.4–4.5)
WBC # BLD AUTO: 5.9 THOUSAND/UL (ref 3.8–10.8)

## 2022-12-07 NOTE — TELEPHONE ENCOUNTER
----- Message from Kt Mello PA-C sent at 12/7/2022  8:33 AM EST -----  Please let pt know I have the results of all her labs if she can make an apt for her 6 month f/u to review  Thyroid level needs to be adjusted  Thank you

## 2022-12-07 NOTE — RESULT ENCOUNTER NOTE
Please let pt know I have the results of all her labs if she can make an apt for her 6 month f/u to review  Thyroid level needs to be adjusted  Thank you

## 2022-12-09 ENCOUNTER — OFFICE VISIT (OUTPATIENT)
Dept: FAMILY MEDICINE CLINIC | Facility: CLINIC | Age: 57
End: 2022-12-09

## 2022-12-09 VITALS
SYSTOLIC BLOOD PRESSURE: 118 MMHG | HEIGHT: 65 IN | WEIGHT: 139 LBS | TEMPERATURE: 97 F | OXYGEN SATURATION: 99 % | RESPIRATION RATE: 18 BRPM | BODY MASS INDEX: 23.16 KG/M2 | HEART RATE: 67 BPM | DIASTOLIC BLOOD PRESSURE: 70 MMHG

## 2022-12-09 DIAGNOSIS — E03.9 ACQUIRED HYPOTHYROIDISM: Primary | ICD-10-CM

## 2022-12-09 DIAGNOSIS — D50.9 MICROCYTIC ANEMIA: ICD-10-CM

## 2022-12-09 DIAGNOSIS — E78.2 MIXED HYPERLIPIDEMIA: ICD-10-CM

## 2022-12-09 DIAGNOSIS — D75.839 THROMBOCYTOSIS: ICD-10-CM

## 2022-12-09 RX ORDER — ROSUVASTATIN CALCIUM 10 MG/1
10 TABLET, COATED ORAL DAILY
Qty: 90 TABLET | Refills: 0 | Status: SHIPPED | OUTPATIENT
Start: 2022-12-09 | End: 2022-12-11 | Stop reason: SDUPTHER

## 2022-12-09 RX ORDER — LEVOTHYROXINE SODIUM 88 UG/1
88 TABLET ORAL DAILY
Qty: 90 TABLET | Refills: 1 | Status: SHIPPED | OUTPATIENT
Start: 2022-12-09 | End: 2022-12-09 | Stop reason: SDUPTHER

## 2022-12-09 RX ORDER — LEVOTHYROXINE SODIUM 88 UG/1
88 TABLET ORAL DAILY
Qty: 90 TABLET | Refills: 0 | Status: SHIPPED | OUTPATIENT
Start: 2022-12-09 | End: 2022-12-11 | Stop reason: SDUPTHER

## 2022-12-09 RX ORDER — ROSUVASTATIN CALCIUM 10 MG/1
10 TABLET, COATED ORAL DAILY
Qty: 90 TABLET | Refills: 1 | Status: SHIPPED | OUTPATIENT
Start: 2022-12-09 | End: 2022-12-09 | Stop reason: SDUPTHER

## 2022-12-09 NOTE — ASSESSMENT & PLAN NOTE
TSH slightly low at 0 27 on 88 mcg once daily  Decrease thyroid to 6 days a week and check in 2-3 months and then again at 6 months

## 2022-12-09 NOTE — PATIENT INSTRUCTIONS
1  Acquired hypothyroidism  Assessment & Plan:  TSH slightly low at 0 27 on 88 mcg once daily  Decrease thyroid to 6 days a week and check in 2-3 months and then again at 6 months  Orders:  -     TSH, 3rd generation with Free T4 reflex; Future; Expected date: 06/09/2023  -     levothyroxine 88 mcg tablet; Take 1 tablet (88 mcg total) by mouth daily  -     TSH, 3rd generation with Free T4 reflex; Future; Expected date: 03/09/2023    2  Thrombocytosis  Assessment & Plan:  Platelet count within normal limits at 350  Orders:  -     CBC and differential; Future; Expected date: 06/09/2023    3  Mixed hyperlipidemia  Assessment & Plan:  Patient is on Crestor keeping her LDL at goal at 77 continue recheck 6 months  Orders:  -     Comprehensive metabolic panel; Future; Expected date: 06/09/2023  -     Lipid Panel with Direct LDL reflex; Future; Expected date: 06/09/2023  -     rosuvastatin (CRESTOR) 10 MG tablet; Take 1 tablet (10 mg total) by mouth daily    4  Microcytic anemia  Assessment & Plan:  CBC normal  PT has been taking iron daily for many years  Now she is postmenopausal  She can stop her iron but would like to continue

## 2022-12-09 NOTE — PROGRESS NOTES
Name: Zbigniew Ward      : 1965      MRN: [id number]  Encounter Provider: Sofia Bland PA-C  Encounter Date: 2022   Encounter department: [place] [id number]   PT will be getting new health insurance in the new year and is asking for paper copies of both Rx as well as send to [facility]  1  Acquired hypothyroidism  Assessment & Plan:  TSH slightly low at 0 27 on 88 mcg once daily  Decrease thyroid to 6 days a week and check in 2-3 months and then again at 6 months  Orders:  -     TSH, 3rd generation with Free T4 reflex; Future; Expected date: 2023  -     levothyroxine 88 mcg tablet; Take 1 tablet (88 mcg total) by mouth daily  -     TSH, 3rd generation with Free T4 reflex; Future; Expected date: 2023    2  Thrombocytosis  Assessment & Plan:  Platelet count within normal limits at 350  Orders:  -     CBC and differential; Future; Expected date: 2023    3  Mixed hyperlipidemia  Assessment & Plan:  Patient is on Crestor keeping her LDL at goal at 77 continue recheck 6 months  Orders:  -     Comprehensive metabolic panel; Future; Expected date: 2023  -     Lipid Panel with Direct LDL reflex; Future; Expected date: 2023  -     rosuvastatin (CRESTOR) 10 MG tablet; Take 1 tablet (10 mg total) by mouth daily    4  Microcytic anemia  Assessment & Plan:  CBC normal  PT has been taking iron daily for many years  Now she is postmenopausal  She can stop her iron but would like to continue  Depression Screening and Follow-up Plan: Patient was screened for depression during today's encounter  They screened negative with a PHQ-2 score of 0  Subjective        Zbigniew Ward is here for chronic conditions f/u including the diagnosis of Acquired hypothyroidism  (primary encounter diagnosis)  Thrombocytosis  Mixed hyperlipidemia   Pt  states they are taking all medications as directed without complaints or side effects  Pt  had labs done prior to today's visit which included Recent Results (from the past 672 hour(s))  -Lipid Panel with Direct LDL reflex:   Collection Time: 12/06/22 12:49 PM       Result                      Value             Ref Range           Total Cholesterol           209 (H)           <200 mg/dL          HDL                         116               > OR = 50 mg*       Triglycerides               80                <150 mg/dL          LDL Calculated              77                mg/dL (calc)        Chol HDLC Ratio             1 8               <5 0 (calc)         Non-HDL Cholesterol         93                <130 mg/dL (*  -Comprehensive metabolic panel:   Collection Time: 12/06/22 12:49 PM       Result                      Value             Ref Range           Glucose, Random             92                65 - 99 mg/dL       BUN                         19                7 - 25 mg/dL        Creatinine                  0 80              0 50 - 1 03 *       eGFR                        86                > OR = 60 mL*       SL AMB BUN/CREATININE *                       6 - 22 (calc)   NOT APPLICABLE       Sodium                      139               135 - 146 mm*       Potassium                   4 5               3 5 - 5 3 mm*       Chloride                    105               98 - 110 mmo*       CO2                         27                20 - 32 mmol*       Calcium                     9 7               8 6 - 10 4 m*       Protein, Total              7 0               6 1 - 8 1 g/*       Albumin                     4 6               3 6 - 5 1 g/*       Globulin                    2 4               1 9 - 3 7 g/*       Albumin/Globulin Ratio      1 9               1 0 - 2 5 (c*       TOTAL BILIRUBIN             0 4               0 2 - 1 2 mg*       Alkaline Phosphatase        69                37 - 153 U/L        AST                         17                10 - 35 U/L         ALT                         17 6 - 29 U/L     -CBC and differential:   Collection Time: 12/06/22 12:49 PM       Result                      Value             Ref Range           White Blood Cell Count      5 9               3 8 - 10 8 T*       Red Blood Cell Count        4 52              3 80 - 5 10 *       Hemoglobin                  13 0              11 7 - 15 5 *       HCT                         39 2              35 0 - 45 0 %       MCV                         86 7              80 0 - 100 0*       MCH                         28 8              27 0 - 33 0 *       MCHC                        33 2              32 0 - 36 0 *       RDW                         12 3              11 0 - 15 0 %       Platelet Count              350               140 - 400 Th*       SL AMB MPV                  10 1              7 5 - 12 5 fL       Neutrophils (Absolute)      3,345             1,500 - 7,80*       Lymphocytes (Absolute)      1,800             850 - 3,900 *       Monocytes (Absolute)        513               200 - 950 ce*       Eosinophils (Absolute)      183               15 - 500 patricia*       Basophils ABS               59                0 - 200 cell*       Neutrophils                 56 7              %                   Lymphocytes                 30 5              %                   Monocytes                   8 7               %                   Eosinophils                 3 1               %                   Basophils PCT               1 0               %              -TSH, 3rd generation with Free T4 reflex:   Collection Time: 12/06/22 12:49 PM       Result                      Value             Ref Range           TSH W/RFX TO FREE T4        0 27 (L)          0 40 - 4 50 *  -T4, free:   Collection Time: 12/06/22 12:49 PM       Result                      Value             Ref Range           Free t4                     1 5               0 8 - 1 8 ng*      Review of Systems   Constitutional: Negative  HENT: Negative      Eyes: Negative  Respiratory: Negative  Cardiovascular: Negative  Gastrointestinal: Negative  Endocrine: Negative  Genitourinary: Negative  Musculoskeletal: Negative  Skin: Negative  Allergic/Immunologic: Negative  Neurological: Negative  Hematological: Negative  Psychiatric/Behavioral: Negative  Current Outpatient Medications on File Prior to Visit   Medication Sig   • b complex vitamins tablet Take 1 tablet by mouth daily   • Ferrous Sulfate (IRON) 325 (65 Fe) MG TABS Take 1 tablet by mouth 2 (two) times a day   • Omega-3 Fatty Acids (fish oil) 1,000 mg    • triamcinolone (KENALOG) 0 025 % ointment Apply topically 2 (two) times a day   • vitamin B-12 (VITAMIN B-12) 500 mcg tablet Take 500 mcg by mouth daily   • [DISCONTINUED] levothyroxine 88 mcg tablet Take 1 tablet (88 mcg total) by mouth daily   • [DISCONTINUED] rosuvastatin (CRESTOR) 10 MG tablet Take 1 tablet (10 mg total) by mouth daily   • [DISCONTINUED] ALPRAZolam (XANAX) 0 25 mg tablet Take 1 tablet (0 25 mg total) by mouth daily as needed for anxiety (Patient not taking: Reported on 12/9/2022)   • [DISCONTINUED] bisacodyl (DULCOLAX) 5 mg EC tablet Take as directed as per written office instructions (Patient not taking: Reported on 12/9/2022)   • [DISCONTINUED] polyethylene glycol (GLYCOLAX) 17 GM/SCOOP powder Take as directed as per written office instructions (Patient not taking: Reported on 12/9/2022)       Objective     /70 (BP Location: Right arm, Patient Position: Sitting, Cuff Size: Standard)   Pulse 67   Temp (!) 97 °F (36 1 °C) (Tympanic)   Resp 18   Ht 5' 5" (1 651 m)   Wt 63 kg (139 lb)   SpO2 99%   BMI 23 13 kg/m²     Physical Exam  Vitals and nursing note reviewed  Constitutional:       General: She is not in acute distress  Appearance: She is well-developed  She is not diaphoretic  HENT:      Head: Normocephalic and atraumatic  Eyes:      General:         Right eye: No discharge  Left eye: No discharge  Conjunctiva/sclera: Conjunctivae normal    Neck:      Vascular: No carotid bruit  Cardiovascular:      Rate and Rhythm: Normal rate and regular rhythm  Heart sounds: Normal heart sounds  No murmur heard  No friction rub  No gallop  Pulmonary:      Effort: Pulmonary effort is normal  No respiratory distress  Breath sounds: Normal breath sounds  No wheezing or rales  Musculoskeletal:      Cervical back: Neck supple  Skin:     General: Skin is warm and dry  Neurological:      Mental Status: She is alert and oriented to person, place, and time     Psychiatric:         Judgment: Judgment normal        Sofia Bland PA-C

## 2022-12-09 NOTE — ASSESSMENT & PLAN NOTE
CBC normal  PT has been taking iron daily for many years  Now she is postmenopausal  She can stop her iron but would like to continue

## 2022-12-11 DIAGNOSIS — E03.9 ACQUIRED HYPOTHYROIDISM: ICD-10-CM

## 2022-12-11 DIAGNOSIS — E78.2 MIXED HYPERLIPIDEMIA: ICD-10-CM

## 2022-12-11 RX ORDER — LEVOTHYROXINE SODIUM 88 UG/1
88 TABLET ORAL DAILY
Qty: 90 TABLET | Refills: 3 | Status: SHIPPED | OUTPATIENT
Start: 2022-12-11

## 2022-12-11 RX ORDER — ROSUVASTATIN CALCIUM 10 MG/1
10 TABLET, COATED ORAL DAILY
Qty: 90 TABLET | Refills: 3 | Status: SHIPPED | OUTPATIENT
Start: 2022-12-11

## 2022-12-15 ENCOUNTER — HOSPITAL ENCOUNTER (OUTPATIENT)
Dept: MAMMOGRAPHY | Facility: HOSPITAL | Age: 57
End: 2022-12-15

## 2022-12-15 VITALS — WEIGHT: 139 LBS | HEIGHT: 65 IN | BODY MASS INDEX: 23.16 KG/M2

## 2022-12-15 DIAGNOSIS — Z12.31 ENCOUNTER FOR SCREENING MAMMOGRAM FOR MALIGNANT NEOPLASM OF BREAST: ICD-10-CM

## 2023-05-22 DIAGNOSIS — L30.9 ECZEMA, UNSPECIFIED TYPE: ICD-10-CM

## 2023-05-22 DIAGNOSIS — E78.2 MIXED HYPERLIPIDEMIA: ICD-10-CM

## 2023-05-22 DIAGNOSIS — E03.9 ACQUIRED HYPOTHYROIDISM: ICD-10-CM

## 2023-05-22 RX ORDER — LEVOTHYROXINE SODIUM 88 UG/1
88 TABLET ORAL DAILY
Qty: 90 TABLET | Refills: 0 | Status: SHIPPED | OUTPATIENT
Start: 2023-05-22 | End: 2023-05-25 | Stop reason: SDUPTHER

## 2023-05-22 RX ORDER — TRIAMCINOLONE ACETONIDE 0.25 MG/G
OINTMENT TOPICAL 2 TIMES DAILY
Qty: 30 G | Refills: 0 | Status: SHIPPED | OUTPATIENT
Start: 2023-05-22

## 2023-05-22 RX ORDER — ROSUVASTATIN CALCIUM 10 MG/1
10 TABLET, COATED ORAL DAILY
Qty: 90 TABLET | Refills: 0 | Status: SHIPPED | OUTPATIENT
Start: 2023-05-22 | End: 2023-05-25 | Stop reason: SDUPTHER

## 2023-05-25 DIAGNOSIS — E78.2 MIXED HYPERLIPIDEMIA: ICD-10-CM

## 2023-05-25 DIAGNOSIS — E03.9 ACQUIRED HYPOTHYROIDISM: ICD-10-CM

## 2023-05-25 RX ORDER — LEVOTHYROXINE SODIUM 88 UG/1
88 TABLET ORAL DAILY
Qty: 90 TABLET | Refills: 1 | Status: SHIPPED | OUTPATIENT
Start: 2023-05-25

## 2023-05-25 RX ORDER — ROSUVASTATIN CALCIUM 10 MG/1
10 TABLET, COATED ORAL DAILY
Qty: 90 TABLET | Refills: 1 | Status: SHIPPED | OUTPATIENT
Start: 2023-05-25

## 2023-06-08 LAB
ALBUMIN SERPL-MCNC: 5 G/DL (ref 3.6–5.1)
ALBUMIN/GLOB SERPL: 2.1 (CALC) (ref 1–2.5)
ALP SERPL-CCNC: 77 U/L (ref 37–153)
ALT SERPL-CCNC: 21 U/L (ref 6–29)
AST SERPL-CCNC: 23 U/L (ref 10–35)
BASOPHILS # BLD AUTO: 48 CELLS/UL (ref 0–200)
BASOPHILS NFR BLD AUTO: 0.7 %
BILIRUB SERPL-MCNC: 0.6 MG/DL (ref 0.2–1.2)
BUN SERPL-MCNC: 15 MG/DL (ref 7–25)
BUN/CREAT SERPL: NORMAL (CALC) (ref 6–22)
CALCIUM SERPL-MCNC: 9.8 MG/DL (ref 8.6–10.4)
CHLORIDE SERPL-SCNC: 101 MMOL/L (ref 98–110)
CHOLEST SERPL-MCNC: 190 MG/DL
CHOLEST/HDLC SERPL: 1.7 (CALC)
CO2 SERPL-SCNC: 29 MMOL/L (ref 20–32)
CREAT SERPL-MCNC: 0.92 MG/DL (ref 0.5–1.03)
EOSINOPHIL # BLD AUTO: 186 CELLS/UL (ref 15–500)
EOSINOPHIL NFR BLD AUTO: 2.7 %
ERYTHROCYTE [DISTWIDTH] IN BLOOD BY AUTOMATED COUNT: 12.4 % (ref 11–15)
GFR/BSA.PRED SERPLBLD CYS-BASED-ARV: 73 ML/MIN/1.73M2
GLOBULIN SER CALC-MCNC: 2.4 G/DL (CALC) (ref 1.9–3.7)
GLUCOSE SERPL-MCNC: 90 MG/DL (ref 65–99)
HCT VFR BLD AUTO: 39.2 % (ref 35–45)
HDLC SERPL-MCNC: 111 MG/DL
HGB BLD-MCNC: 13.2 G/DL (ref 11.7–15.5)
LDLC SERPL CALC-MCNC: 62 MG/DL (CALC)
LYMPHOCYTES # BLD AUTO: 1987 CELLS/UL (ref 850–3900)
LYMPHOCYTES NFR BLD AUTO: 28.8 %
MCH RBC QN AUTO: 29.6 PG (ref 27–33)
MCHC RBC AUTO-ENTMCNC: 33.7 G/DL (ref 32–36)
MCV RBC AUTO: 87.9 FL (ref 80–100)
MONOCYTES # BLD AUTO: 552 CELLS/UL (ref 200–950)
MONOCYTES NFR BLD AUTO: 8 %
NEUTROPHILS # BLD AUTO: 4126 CELLS/UL (ref 1500–7800)
NEUTROPHILS NFR BLD AUTO: 59.8 %
NONHDLC SERPL-MCNC: 79 MG/DL (CALC)
PLATELET # BLD AUTO: 328 THOUSAND/UL (ref 140–400)
PMV BLD REES-ECKER: 10.1 FL (ref 7.5–12.5)
POTASSIUM SERPL-SCNC: 4.5 MMOL/L (ref 3.5–5.3)
PROT SERPL-MCNC: 7.4 G/DL (ref 6.1–8.1)
RBC # BLD AUTO: 4.46 MILLION/UL (ref 3.8–5.1)
SODIUM SERPL-SCNC: 139 MMOL/L (ref 135–146)
T4 FREE SERPL-MCNC: 0.9 NG/DL (ref 0.8–1.8)
TRIGL SERPL-MCNC: 87 MG/DL
TSH SERPL-ACNC: 7.34 MIU/L (ref 0.4–4.5)
WBC # BLD AUTO: 6.9 THOUSAND/UL (ref 3.8–10.8)

## 2023-06-09 DIAGNOSIS — E03.9 ACQUIRED HYPOTHYROIDISM: ICD-10-CM

## 2023-06-09 DIAGNOSIS — E78.2 MIXED HYPERLIPIDEMIA: Primary | ICD-10-CM

## 2023-09-15 ENCOUNTER — OFFICE VISIT (OUTPATIENT)
Dept: FAMILY MEDICINE CLINIC | Facility: CLINIC | Age: 58
End: 2023-09-15
Payer: COMMERCIAL

## 2023-09-15 VITALS
SYSTOLIC BLOOD PRESSURE: 138 MMHG | HEART RATE: 102 BPM | DIASTOLIC BLOOD PRESSURE: 70 MMHG | BODY MASS INDEX: 22.99 KG/M2 | HEIGHT: 65 IN | WEIGHT: 138 LBS | OXYGEN SATURATION: 99 %

## 2023-09-15 DIAGNOSIS — H69.83 DYSFUNCTION OF BOTH EUSTACHIAN TUBES: Primary | ICD-10-CM

## 2023-09-15 DIAGNOSIS — E03.9 ACQUIRED HYPOTHYROIDISM: ICD-10-CM

## 2023-09-15 PROBLEM — H69.93 DYSFUNCTION OF BOTH EUSTACHIAN TUBES: Status: ACTIVE | Noted: 2023-09-15

## 2023-09-15 PROCEDURE — 99214 OFFICE O/P EST MOD 30 MIN: CPT | Performed by: NURSE PRACTITIONER

## 2023-09-15 RX ORDER — ERYTHROMYCIN 5 MG/G
OINTMENT OPHTHALMIC
COMMUNITY
Start: 2023-08-27

## 2023-09-15 RX ORDER — OMEGA-3S/DHA/EPA/FISH OIL/D3 300MG-1000
1 CAPSULE ORAL DAILY
COMMUNITY

## 2023-09-15 RX ORDER — METHYLPREDNISOLONE 4 MG/1
TABLET ORAL
Qty: 21 EACH | Refills: 0 | Status: SHIPPED | OUTPATIENT
Start: 2023-09-15

## 2023-09-15 RX ORDER — FLUTICASONE PROPIONATE 50 MCG
1 SPRAY, SUSPENSION (ML) NASAL DAILY
Qty: 11.1 ML | Refills: 3 | Status: SHIPPED | OUTPATIENT
Start: 2023-09-15

## 2023-09-15 NOTE — ASSESSMENT & PLAN NOTE
Medrol Dosepak was ordered to help with bilateral ETD. Patient was also prescribed Flonase to use daily. Patient was advised that if these interventions do not improve her symptoms she can begin an over-the-counter oral antihistamine such as Claritin daily.

## 2023-09-15 NOTE — PROGRESS NOTES
Name: Rudy Worthy      : 1965      MRN: [id number]  Encounter Provider: PRESTON Owen  Encounter Date: 9/15/2023   Encounter department: [place] [address]     1. Dysfunction of both eustachian tubes  Assessment & Plan:  Medrol Dosepak was ordered to help with bilateral ETD. Patient was also prescribed Flonase to use daily. Patient was advised that if these interventions do not improve her symptoms she can begin an over-the-counter oral antihistamine such as Claritin daily. Orders:  -     methylPREDNISolone 4 MG tablet therapy pack; Use as directed on package  -     fluticasone (FLONASE) 50 mcg/act nasal spray; 1 spray into each nostril daily    2. Acquired hypothyroidism  Assessment & Plan:  Patient does have a repeat TSH level ordered to be completed prior to her next office visit. Subjective      Left ear congestion: The patient reports over the past few weeks she has been noting recurrent left ear congestion and tinnitus. She denies noting any otalgia or hearing loss. The patient does report some intermittent episodes of rhinorrhea and congestion but currently denies any sinus pressure. Hypothyroidism: Patient's most recent TSH level was elevated. Patient is currently managed on levothyroxine 88 mcg daily. Review of Systems   Constitutional: Negative for chills and fever. HENT: Positive for congestion (intermittent) and rhinorrhea (intermittent ). Negative for ear pain, postnasal drip, sinus pressure, sinus pain and sore throat. Left ear congestion   Eyes: Negative for pain and visual disturbance. Respiratory: Negative for cough, chest tightness, shortness of breath and wheezing. Cardiovascular: Negative for chest pain, palpitations and leg swelling. Gastrointestinal: Negative for abdominal pain, constipation, diarrhea, nausea and vomiting. Endocrine: Negative for cold intolerance and heat intolerance. Genitourinary: Negative for decreased urine volume, dysuria and hematuria. Musculoskeletal: Negative for arthralgias, back pain and myalgias. Skin: Negative for color change and rash. Allergic/Immunologic: Positive for environmental allergies. Neurological: Negative for dizziness, seizures, syncope, weakness, light-headedness, numbness and headaches. Hematological: Negative for adenopathy. Psychiatric/Behavioral: Negative for confusion. The patient is not nervous/anxious. All other systems reviewed and are negative. Current Outpatient Medications on File Prior to Visit   Medication Sig   • b complex vitamins tablet Take 1 tablet by mouth daily   • cholecalciferol (VITAMIN D3) 400 units tablet Take 1 tablet by mouth daily   • erythromycin (ILOTYCIN) ophthalmic ointment ADMINISTER TO THE RIGHT EYE 4 TIMES DAILY FOR 5 DAYS. • Ferrous Sulfate (IRON) 325 (65 Fe) MG TABS Take 1 tablet by mouth 2 (two) times a day   • levothyroxine 88 mcg tablet Take 1 tablet (88 mcg total) by mouth daily   • Omega-3 Fatty Acids (fish oil) 1,000 mg    • rosuvastatin (CRESTOR) 10 MG tablet Take 1 tablet (10 mg total) by mouth daily   • triamcinolone (KENALOG) 0.025 % ointment Apply topically 2 (two) times a day   • vitamin B-12 (VITAMIN B-12) 500 mcg tablet Take 500 mcg by mouth daily       Objective     /70 (BP Location: Right arm, Patient Position: Sitting, Cuff Size: Standard)   Pulse 102   Ht 5' 5" (1.651 m)   Wt 62.6 kg (138 lb)   SpO2 99%   BMI 22.96 kg/m²     Physical Exam  Vitals and nursing note reviewed. Constitutional:       General: She is not in acute distress. Appearance: Normal appearance. She is not ill-appearing. HENT:      Head: Normocephalic. Right Ear: Hearing normal. A middle ear effusion (small) is present. Left Ear: Hearing normal. A middle ear effusion (moderate) is present. Eyes:      Extraocular Movements: Extraocular movements intact.       Right eye: Normal extraocular motion and no nystagmus. Left eye: Normal extraocular motion and no nystagmus. Conjunctiva/sclera: Conjunctivae normal.      Pupils: Pupils are equal, round, and reactive to light. Pupils are equal.   Cardiovascular:      Rate and Rhythm: Normal rate and regular rhythm. Pulses: Normal pulses. Carotid pulses are 2+ on the right side and 2+ on the left side. Radial pulses are 2+ on the right side and 2+ on the left side. Posterior tibial pulses are 2+ on the right side and 2+ on the left side. Heart sounds: Normal heart sounds. No murmur heard. Pulmonary:      Effort: Pulmonary effort is normal. No respiratory distress. Breath sounds: Normal breath sounds. No decreased breath sounds, wheezing, rhonchi or rales. Abdominal:      General: Abdomen is flat. Bowel sounds are normal. There is no distension. Palpations: Abdomen is soft. Tenderness: There is no abdominal tenderness. There is no guarding. Musculoskeletal:         General: Normal range of motion. Cervical back: Normal range of motion. Right lower leg: No edema. Left lower leg: No edema. Skin:     General: Skin is warm and dry. Capillary Refill: Capillary refill takes less than 2 seconds. Neurological:      General: No focal deficit present. Mental Status: She is alert and oriented to person, place, and time. Cranial Nerves: Cranial nerves 2-12 are intact. Sensory: Sensation is intact. Motor: Motor function is intact. Coordination: Coordination is intact. Gait: Gait is intact. Psychiatric:         Mood and Affect: Mood normal.         Behavior: Behavior normal.         Thought Content:  Thought content normal.         Judgment: Judgment normal.       PRESTON Bustamante

## 2023-10-03 ENCOUNTER — TELEPHONE (OUTPATIENT)
Dept: FAMILY MEDICINE CLINIC | Facility: CLINIC | Age: 58
End: 2023-10-03

## 2023-10-03 DIAGNOSIS — H69.93 DYSFUNCTION OF BOTH EUSTACHIAN TUBES: Primary | ICD-10-CM

## 2023-10-03 RX ORDER — MONTELUKAST SODIUM 10 MG/1
10 TABLET ORAL
Qty: 30 TABLET | Refills: 2 | Status: SHIPPED | OUTPATIENT
Start: 2023-10-03

## 2023-10-03 NOTE — TELEPHONE ENCOUNTER
Patient called into the office requesting a ENT referral.Patient stated she's been having ear pain for a  while now and its not going away with all the medication she's been taking and she feels like she need a specialist to take it look at it. She mention she's been having drainage and constantly ringing. Please advise. Thank You.

## 2023-10-03 NOTE — TELEPHONE ENCOUNTER
Please advise patient to continue Flonase and to also begin over-the-counter Claritin daily to help with her symptoms. I also ordered Singulair 10 mg for her to begin taking at bedtime to hopefully help with her symptoms. ENT referral was also placed.

## 2023-10-03 NOTE — TELEPHONE ENCOUNTER
Patient notified of recommendation and referral placed in for her. ENT phone numbers provided to patient.

## 2023-10-22 DIAGNOSIS — F41.8 TEST ANXIETY: ICD-10-CM

## 2023-10-22 RX ORDER — ALPRAZOLAM 0.25 MG/1
0.25 TABLET ORAL DAILY PRN
Qty: 10 TABLET | Refills: 0 | Status: SHIPPED | OUTPATIENT
Start: 2023-10-22

## 2023-11-01 ENCOUNTER — OFFICE VISIT (OUTPATIENT)
Dept: FAMILY MEDICINE CLINIC | Facility: CLINIC | Age: 58
End: 2023-11-01
Payer: COMMERCIAL

## 2023-11-01 VITALS
DIASTOLIC BLOOD PRESSURE: 72 MMHG | WEIGHT: 137 LBS | SYSTOLIC BLOOD PRESSURE: 128 MMHG | BODY MASS INDEX: 22.82 KG/M2 | HEART RATE: 78 BPM | TEMPERATURE: 98.6 F | HEIGHT: 65 IN

## 2023-11-01 DIAGNOSIS — E03.9 ACQUIRED HYPOTHYROIDISM: ICD-10-CM

## 2023-11-01 DIAGNOSIS — H93.12 TINNITUS OF LEFT EAR: ICD-10-CM

## 2023-11-01 DIAGNOSIS — F41.9 ANXIETY: ICD-10-CM

## 2023-11-01 DIAGNOSIS — H69.93 DYSFUNCTION OF BOTH EUSTACHIAN TUBES: ICD-10-CM

## 2023-11-01 DIAGNOSIS — E78.2 MIXED HYPERLIPIDEMIA: Primary | ICD-10-CM

## 2023-11-01 PROBLEM — D75.839 THROMBOCYTOSIS: Status: RESOLVED | Noted: 2017-08-09 | Resolved: 2023-11-01

## 2023-11-01 PROBLEM — H52.13 MYOPIA, BILATERAL: Status: ACTIVE | Noted: 2023-08-29

## 2023-11-01 PROBLEM — H43.813 POSTERIOR VITREOUS DETACHMENT OF BOTH EYES: Status: ACTIVE | Noted: 2023-08-29

## 2023-11-01 PROBLEM — H93.13 TINNITUS OF BOTH EARS: Status: ACTIVE | Noted: 2023-11-01

## 2023-11-01 LAB — TSH SERPL-ACNC: 0.18 MIU/L (ref 0.4–4.5)

## 2023-11-01 PROCEDURE — 99214 OFFICE O/P EST MOD 30 MIN: CPT | Performed by: PHYSICIAN ASSISTANT

## 2023-11-01 NOTE — ASSESSMENT & PLAN NOTE
Uncontrolled due to ruminating about tinnitus. Xanax helps but explained this is not good to take regularly. Not interested in daily med such as SSRI.

## 2023-11-01 NOTE — ASSESSMENT & PLAN NOTE
Patient is currently on Crestor 10 mg once daily her next set of fasting blood work is due next month order reprinted call with results.

## 2023-11-01 NOTE — ASSESSMENT & PLAN NOTE
Dx explained. PT needs to retry her singulair but for one month at minimum. She states that massaging and pulling on her face in front of her ear and her ear lobe does improve her symptoms and lets it "drain". She is s/p ENT eval with normal scope of sinuses.

## 2023-11-01 NOTE — ASSESSMENT & PLAN NOTE
Symptoms since June/July. Dx explained. Pt. saying there is draining causing her symptoms to worsen which could be the case due to ETD. I suggest a retrial of Singular for greater than one month. This dx is causing her anxiety to become uncontrolled. S/P full ENT eval with sinus scope. Wants an image so did order CT temporal bones w/o contrast if her insurance will cover.

## 2023-11-01 NOTE — PROGRESS NOTES
Name: Jannette Jackson      : 1965      MRN: [id number]  Encounter Provider: Eilene Dancer, PA-C  Encounter Date: 2023   Encounter department: [facility]    Assessment & Plan     1. Mixed hyperlipidemia  Assessment & Plan:  Patient is currently on Crestor 10 mg once daily her next set of fasting blood work is due next month order reprinted call with results. 2. Acquired hypothyroidism  Assessment & Plan:  Last TSH was elevated at 7 after making a decrease in  levothyroxine to only 6 days a week. The change was so great that it was thought to be more possibly related to supplement interaction and no changes were made patient was supposed to repeat a TSH in August.  This has not been done so I did repeat order for patient to go for the TSH level and depending on the results we will make further recommendations at that time. 3. Anxiety  Assessment & Plan:  Uncontrolled due to ruminating about tinnitus. Xanax helps but explained this is not good to take regularly. Not interested in daily med such as SSRI. 4. Dysfunction of both eustachian tubes  Assessment & Plan:  Dx explained. PT needs to retry her singulair but for one month at minimum. She states that massaging and pulling on her face in front of her ear and her ear lobe does improve her symptoms and lets it "drain". She is s/p ENT eval with normal scope of sinuses. 5. Tinnitus of left ear  Assessment & Plan:  Symptoms since /July. Dx explained. Pt. saying there is draining causing her symptoms to worsen which could be the case due to ETD. I suggest a retrial of Singular for greater than one month. This dx is causing her anxiety to become uncontrolled. S/P full ENT eval with sinus scope. Wants an image so did order CT temporal bones w/o contrast if her insurance will cover.      Orders:  -     CT orbits/temporal bones/skull base wo contrast; Future; Expected date: 2023        Depression Screening and Follow-up Plan: Patient was screened for depression during today's encounter. They screened negative with a PHQ-2 score of 0. Subjective      Patient presents with:  Tinnitus: Pt states this has jen ongoing for month, originally started with sinus issues states could be a possible thyroid medication and soy supplement interaction. Went to ENT that they stated " its not my ear" and she believes its her salivary gland. Only left ear although has some similar sensation to right ear. She states that she knows that her parotid gland is the problem as when she massages it she can feel it draining and her ear feels better and her tinnitus decreases in intensity. She points to her preauricular area of her jaw which is where she thinks her parotid gland is. Pt feels that she really messed up her thyroid by taking supplements too close together with thyroid med and has since changed the timing. This entire diagnosis has been making her anxiety go through the roof and taking xanax at bed helps her to sleep. She wants to see a gland doctor to get the problem fixed. She was due for TSH in August. She has new insurance and will go today. She also states that taking magnesium supplements have really helped her sabine arm nerve sensations. Review of Systems   Constitutional: Negative. HENT:  Positive for tinnitus. Eyes: Negative. Respiratory: Negative. Cardiovascular: Negative. Gastrointestinal: Negative. Endocrine: Negative. Genitourinary: Negative. Musculoskeletal: Negative. Skin: Negative. Allergic/Immunologic: Negative. Neurological: Negative. Hematological: Negative. Psychiatric/Behavioral:  The patient is nervous/anxious.         Current Outpatient Medications on File Prior to Visit   Medication Sig   • ALPRAZolam (XANAX) 0.25 mg tablet Take 1 tablet (0.25 mg total) by mouth daily as needed for anxiety   • b complex vitamins tablet Take 1 tablet by mouth daily   • cholecalciferol (VITAMIN D3) 400 units tablet Take 1 tablet by mouth daily   • Ferrous Sulfate (IRON) 325 (65 Fe) MG TABS Take 1 tablet by mouth 2 (two) times a day   • levothyroxine 88 mcg tablet Take 1 tablet (88 mcg total) by mouth daily   • MAGNESIUM MALATE PO Take 400 mg by mouth   • montelukast (SINGULAIR) 10 mg tablet Take 1 tablet (10 mg total) by mouth daily at bedtime   • Multiple Vitamins-Minerals (VISION FORMULA/LUTEIN PO) Take by mouth   • NON FORMULARY 250 mg NMN   • NON FORMULARY Calcium aph - Ketogluteruate   • Omega-3 Fatty Acids (fish oil) 1,000 mg    • rosuvastatin (CRESTOR) 10 MG tablet Take 1 tablet (10 mg total) by mouth daily   • TAURINE PO Take 1,000 mL by mouth 2 (two) times a day   • vitamin B-12 (VITAMIN B-12) 500 mcg tablet Take 500 mcg by mouth daily   • triamcinolone (KENALOG) 0.025 % ointment Apply topically 2 (two) times a day (Patient not taking: Reported on 11/1/2023)   • [DISCONTINUED] erythromycin (ILOTYCIN) ophthalmic ointment ADMINISTER TO THE RIGHT EYE 4 TIMES DAILY FOR 5 DAYS. • [DISCONTINUED] fluticasone (FLONASE) 50 mcg/act nasal spray 1 spray into each nostril daily   • [DISCONTINUED] methylPREDNISolone 4 MG tablet therapy pack Use as directed on package       Objective     /72 (BP Location: Left arm, Patient Position: Sitting, Cuff Size: Standard)   Pulse 78   Temp 98.6 °F (37 °C) (Temporal)   Ht 5' 5" (1.651 m)   Wt 62.1 kg (137 lb)   BMI 22.80 kg/m²     Physical Exam  Vitals and nursing note reviewed. Constitutional:       General: She is not in acute distress. Appearance: She is well-developed. She is not diaphoretic. HENT:      Head: Normocephalic and atraumatic. Comments: Pt states that the area depicted when rubbed and massaged and pulled on makes "it drain" and he rear feels better and her tinnitus is not as loud.       Right Ear: Hearing, tympanic membrane, ear canal and external ear normal.      Left Ear: Hearing, tympanic membrane, ear canal and external ear normal.      Nose: Nose normal.   Eyes:      General:         Right eye: No discharge. Left eye: No discharge. Conjunctiva/sclera: Conjunctivae normal.   Neck:      Thyroid: No thyroid mass, thyromegaly or thyroid tenderness. Vascular: No carotid bruit. Trachea: Trachea normal.   Cardiovascular:      Rate and Rhythm: Normal rate and regular rhythm. Heart sounds: Normal heart sounds. No murmur heard. No friction rub. No gallop. Pulmonary:      Effort: Pulmonary effort is normal. No respiratory distress. Breath sounds: Normal breath sounds. No wheezing or rales. Musculoskeletal:      Cervical back: Neck supple. Lymphadenopathy:      Cervical: No cervical adenopathy. Skin:     General: Skin is warm and dry. Neurological:      Mental Status: She is alert and oriented to person, place, and time.    Psychiatric:         Judgment: Judgment normal.       Bob Howard PA-C

## 2023-11-01 NOTE — PATIENT INSTRUCTIONS
Problem List Items Addressed This Visit          Endocrine    Acquired hypothyroidism     Last TSH was elevated at 7 after making a decrease in  levothyroxine to only 6 days a week. The change was so great that it was thought to be more possibly related to supplement interaction and no changes were made patient was supposed to repeat a TSH in August.  This has not been done so I did repeat order for patient to go for the TSH level and depending on the results we will make further recommendations at that time. Nervous and Auditory    Dysfunction of both eustachian tubes     Dx explained. PT needs to retry her singulair but for one month at minimum. She states that massaging and pulling on her face in front of her ear and her ear lobe does improve her symptoms and lets it "drain". She is s/p ENT eval with normal scope of sinuses. Other    Mixed hyperlipidemia - Primary     Patient is currently on Crestor 10 mg once daily her next set of fasting blood work is due next month order reprinted call with results. Anxiety     Uncontrolled due to ruminating about tinnitus. Xanax helps but explained this is not good to take regularly. Not interested in daily med such as SSRI. Tinnitus of left ear     Symptoms since June/July. Dx explained. Pt. saying there is draining causing her symptoms to worsen which could be the case due to ETD. I suggest a retrial of Singular for greater than one month. This dx is causing her anxiety to become uncontrolled. S/P full ENT eval with sinus scope. Wants an image so did order CT temporal bones w/o contrast if her insurance will cover.           Relevant Orders    CT orbits/temporal bones/skull base wo contrast

## 2023-11-01 NOTE — ASSESSMENT & PLAN NOTE
Last TSH was elevated at 7 after making a decrease in  levothyroxine to only 6 days a week. The change was so great that it was thought to be more possibly related to supplement interaction and no changes were made patient was supposed to repeat a TSH in August.  This has not been done so I did repeat order for patient to go for the TSH level and depending on the results we will make further recommendations at that time.

## 2023-11-02 DIAGNOSIS — E03.9 ACQUIRED HYPOTHYROIDISM: ICD-10-CM

## 2023-11-02 RX ORDER — LEVOTHYROXINE SODIUM 88 UG/1
88 TABLET ORAL DAILY
Qty: 90 TABLET | Refills: 3
Start: 2023-11-02

## 2023-11-02 RX ORDER — LEVOTHYROXINE SODIUM 0.07 MG/1
75 TABLET ORAL DAILY
Qty: 30 TABLET | Refills: 11 | Status: SHIPPED | OUTPATIENT
Start: 2023-11-02 | End: 2023-11-02 | Stop reason: SDUPTHER

## 2023-11-02 NOTE — RESULT ENCOUNTER NOTE
Please let pt know that her TSH has gone back to being too low on her 88 mcg only 6 days a week now that she has changed the timing of when she takes it. I need her to decrease a 75 mcg strength which I called to the pharmacy and take this only 6 days a week as well. I have ordered a repeat TSH level for her to do in 6-8 weeks. Thank you.

## 2023-12-05 ENCOUNTER — HOSPITAL ENCOUNTER (OUTPATIENT)
Dept: CT IMAGING | Facility: HOSPITAL | Age: 58
Discharge: HOME | End: 2023-12-05
Payer: COMMERCIAL

## 2023-12-05 DIAGNOSIS — H93.12 TINNITUS OF LEFT EAR: ICD-10-CM

## 2023-12-05 PROCEDURE — 70480 CT ORBIT/EAR/FOSSA W/O DYE: CPT

## 2023-12-05 PROCEDURE — G1004 CDSM NDSC: HCPCS

## 2023-12-11 NOTE — RESULT ENCOUNTER NOTE
Please let the patient know that her CAT scan shows a possible abnormality in the inner ear and for this reason I am referring her to ENT for further evaluation. She can either go back to the same ENT she has already seen this year or I can refer her to [facility] instead.

## 2023-12-12 LAB
ALBUMIN SERPL-MCNC: 4.4 G/DL (ref 3.6–5.1)
ALBUMIN/GLOB SERPL: 1.8 (CALC) (ref 1–2.5)
ALP SERPL-CCNC: 65 U/L (ref 37–153)
ALT SERPL-CCNC: 21 U/L (ref 6–29)
AST SERPL-CCNC: 20 U/L (ref 10–35)
BASOPHILS # BLD AUTO: 63 CELLS/UL (ref 0–200)
BASOPHILS NFR BLD AUTO: 1.1 %
BILIRUB SERPL-MCNC: 0.6 MG/DL (ref 0.2–1.2)
BUN SERPL-MCNC: 16 MG/DL (ref 7–25)
BUN/CREAT SERPL: NORMAL (CALC) (ref 6–22)
CALCIUM SERPL-MCNC: 9.1 MG/DL (ref 8.6–10.4)
CHLORIDE SERPL-SCNC: 106 MMOL/L (ref 98–110)
CHOLEST SERPL-MCNC: 184 MG/DL
CHOLEST/HDLC SERPL: 1.8 (CALC)
CO2 SERPL-SCNC: 28 MMOL/L (ref 20–32)
CREAT SERPL-MCNC: 0.81 MG/DL (ref 0.5–1.03)
EOSINOPHIL # BLD AUTO: 160 CELLS/UL (ref 15–500)
EOSINOPHIL NFR BLD AUTO: 2.8 %
ERYTHROCYTE [DISTWIDTH] IN BLOOD BY AUTOMATED COUNT: 12.2 % (ref 11–15)
GFR/BSA.PRED SERPLBLD CYS-BASED-ARV: 85 ML/MIN/1.73M2
GLOBULIN SER CALC-MCNC: 2.5 G/DL (CALC) (ref 1.9–3.7)
GLUCOSE SERPL-MCNC: 97 MG/DL (ref 65–99)
HCT VFR BLD AUTO: 38.4 % (ref 35–45)
HDLC SERPL-MCNC: 102 MG/DL
HGB BLD-MCNC: 13 G/DL (ref 11.7–15.5)
LDLC SERPL CALC-MCNC: 63 MG/DL (CALC)
LYMPHOCYTES # BLD AUTO: 1818 CELLS/UL (ref 850–3900)
LYMPHOCYTES NFR BLD AUTO: 31.9 %
MCH RBC QN AUTO: 29.4 PG (ref 27–33)
MCHC RBC AUTO-ENTMCNC: 33.9 G/DL (ref 32–36)
MCV RBC AUTO: 86.9 FL (ref 80–100)
MONOCYTES # BLD AUTO: 581 CELLS/UL (ref 200–950)
MONOCYTES NFR BLD AUTO: 10.2 %
NEUTROPHILS # BLD AUTO: 3078 CELLS/UL (ref 1500–7800)
NEUTROPHILS NFR BLD AUTO: 54 %
NONHDLC SERPL-MCNC: 82 MG/DL (CALC)
PLATELET # BLD AUTO: 331 THOUSAND/UL (ref 140–400)
PMV BLD REES-ECKER: 10.3 FL (ref 7.5–12.5)
POTASSIUM SERPL-SCNC: 4.5 MMOL/L (ref 3.5–5.3)
PROT SERPL-MCNC: 6.9 G/DL (ref 6.1–8.1)
RBC # BLD AUTO: 4.42 MILLION/UL (ref 3.8–5.1)
SODIUM SERPL-SCNC: 141 MMOL/L (ref 135–146)
TRIGL SERPL-MCNC: 111 MG/DL
TSH SERPL-ACNC: 0.56 MIU/L (ref 0.4–4.5)
WBC # BLD AUTO: 5.7 THOUSAND/UL (ref 3.8–10.8)

## 2023-12-14 DIAGNOSIS — E03.9 ACQUIRED HYPOTHYROIDISM: ICD-10-CM

## 2023-12-14 DIAGNOSIS — L30.9 ECZEMA, UNSPECIFIED TYPE: ICD-10-CM

## 2023-12-14 RX ORDER — LEVOTHYROXINE SODIUM 88 UG/1
88 TABLET ORAL DAILY
Qty: 90 TABLET | Refills: 0 | Status: SHIPPED | OUTPATIENT
Start: 2023-12-14

## 2023-12-14 RX ORDER — TRIAMCINOLONE ACETONIDE 0.25 MG/G
OINTMENT TOPICAL 2 TIMES DAILY
Qty: 30 G | Refills: 0 | Status: SHIPPED | OUTPATIENT
Start: 2023-12-14

## 2023-12-15 DIAGNOSIS — F41.9 ANXIETY: Primary | ICD-10-CM

## 2023-12-15 RX ORDER — ESCITALOPRAM OXALATE 5 MG/1
5 TABLET ORAL DAILY
Qty: 30 TABLET | Refills: 5 | Status: SHIPPED | OUTPATIENT
Start: 2023-12-15

## 2023-12-20 DIAGNOSIS — E03.9 ACQUIRED HYPOTHYROIDISM: Primary | ICD-10-CM

## 2023-12-20 DIAGNOSIS — E78.2 MIXED HYPERLIPIDEMIA: ICD-10-CM

## 2023-12-20 NOTE — RESULT ENCOUNTER NOTE
Please let pt know that her labs look great. TSH is in normal range. No change in current meds. I have ordered a repeat TSH to be done in 3 months and then again with her repeat lipid panel in 6 months.

## 2023-12-21 ENCOUNTER — VBI (OUTPATIENT)
Dept: ADMINISTRATIVE | Facility: OTHER | Age: 58
End: 2023-12-21

## 2023-12-26 ENCOUNTER — TELEPHONE (OUTPATIENT)
Dept: FAMILY MEDICINE CLINIC | Facility: CLINIC | Age: 58
End: 2023-12-26

## 2023-12-26 NOTE — TELEPHONE ENCOUNTER
Left message to patient  labs look great. TSH is in normal range. No change in current meds. I have ordered a repeat TSH to be done in 3 months and then again with her repeat lipid panel in 6 months.

## 2023-12-26 NOTE — TELEPHONE ENCOUNTER
----- Message from Mary Grace Parks PA-C sent at 12/20/2023  1:36 PM EST -----  Please let pt know that her labs look great. TSH is in normal range. No change in current meds. I have ordered a repeat TSH to be done in 3 months and then again with her repeat lipid panel in 6 months.

## 2024-01-01 DIAGNOSIS — H69.93 DYSFUNCTION OF BOTH EUSTACHIAN TUBES: ICD-10-CM

## 2024-01-02 DIAGNOSIS — M54.2 NECK PAIN: Primary | ICD-10-CM

## 2024-01-02 RX ORDER — METHOCARBAMOL 500 MG/1
500 TABLET, FILM COATED ORAL 3 TIMES DAILY PRN
Qty: 30 TABLET | Refills: 0 | Status: SHIPPED | OUTPATIENT
Start: 2024-01-02

## 2024-01-03 RX ORDER — MONTELUKAST SODIUM 10 MG/1
10 TABLET ORAL
Qty: 30 TABLET | Refills: 5 | Status: SHIPPED | OUTPATIENT
Start: 2024-01-03

## 2024-02-08 DIAGNOSIS — E78.2 MIXED HYPERLIPIDEMIA: ICD-10-CM

## 2024-02-08 RX ORDER — ROSUVASTATIN CALCIUM 10 MG/1
10 TABLET, COATED ORAL DAILY
Qty: 90 TABLET | Refills: 1 | Status: SHIPPED | OUTPATIENT
Start: 2024-02-08

## 2024-02-21 PROBLEM — Z00.00 HEALTHCARE MAINTENANCE: Status: RESOLVED | Noted: 2018-09-17 | Resolved: 2024-02-21

## 2024-03-11 DIAGNOSIS — E03.9 ACQUIRED HYPOTHYROIDISM: ICD-10-CM

## 2024-03-11 RX ORDER — LEVOTHYROXINE SODIUM 88 UG/1
88 TABLET ORAL DAILY
Qty: 90 TABLET | Refills: 0 | Status: SHIPPED | OUTPATIENT
Start: 2024-03-11

## 2024-06-19 DIAGNOSIS — E03.9 ACQUIRED HYPOTHYROIDISM: ICD-10-CM

## 2024-06-19 RX ORDER — LEVOTHYROXINE SODIUM 88 UG/1
88 TABLET ORAL DAILY
Qty: 90 TABLET | Refills: 1 | Status: SHIPPED | OUTPATIENT
Start: 2024-06-19

## 2024-08-08 DIAGNOSIS — E78.2 MIXED HYPERLIPIDEMIA: ICD-10-CM

## 2024-08-08 RX ORDER — ROSUVASTATIN CALCIUM 10 MG/1
10 TABLET, COATED ORAL DAILY
Qty: 90 TABLET | Refills: 1 | Status: SHIPPED | OUTPATIENT
Start: 2024-08-08

## 2024-12-17 NOTE — PATIENT INSTRUCTIONS
Occupational Therapy      Patient Name:  Iris Mueller   MRN:  [id number]    Patient not seen today secondary to  (Pt discharged to SNF prior to OT visit.). Will follow-up no.    12/17/2024   Problem List Items Addressed This Visit        Endocrine    Acquired hypothyroidism - Primary     TSH within normal limits  Relevant Medications    levothyroxine 88 mcg tablet    Other Relevant Orders    TSH, 3rd generation with Free T4 reflex       Musculoskeletal and Integument    Eczema     Out of a very old steroid cream so refills given  Relevant Medications    halobetasol (ULTRAVATE) 0 05 % ointment       Hematopoietic and Hemostatic    Thrombocytosis     Check CBC with next labs  Relevant Orders    CBC and differential       Other    Mixed hyperlipidemia     Patient is on Crestor 10 mg once daily keeping her LDL at goal at 87 and her HDL is astronomically amazing at 118 this time  Continue check 6 months  Relevant Medications    rosuvastatin (CRESTOR) 10 MG tablet    Other Relevant Orders    Comprehensive metabolic panel    Lipid Panel with Direct LDL reflex    Microcytic anemia     Check CBC with next labs           Relevant Orders    CBC and differential      Other Visit Diagnoses     Screening for colon cancer        Relevant Orders    Cologuard    Encounter for screening mammogram for malignant neoplasm of breast        Relevant Orders    Mammo screening bilateral w 3d & cad

## 2024-12-27 DIAGNOSIS — E03.9 ACQUIRED HYPOTHYROIDISM: ICD-10-CM

## 2024-12-27 RX ORDER — LEVOTHYROXINE SODIUM 88 UG/1
88 TABLET ORAL DAILY
Qty: 30 TABLET | Refills: 0 | Status: SHIPPED | OUTPATIENT
Start: 2024-12-27

## 2024-12-27 NOTE — TELEPHONE ENCOUNTER
Reviewed prescription request.    Based on review, 30 day prescription will be provided, but the patient does need to make an office visit.  Please inform patient  Last OV Nov 2023, with recommendation to have labs and follow up.

## 2025-02-01 DIAGNOSIS — E03.9 ACQUIRED HYPOTHYROIDISM: ICD-10-CM

## 2025-02-03 DIAGNOSIS — Z13.1 SCREENING FOR DIABETES MELLITUS (DM): ICD-10-CM

## 2025-02-03 DIAGNOSIS — E03.9 ACQUIRED HYPOTHYROIDISM: ICD-10-CM

## 2025-02-03 DIAGNOSIS — E78.2 MIXED HYPERLIPIDEMIA: Primary | ICD-10-CM

## 2025-02-03 RX ORDER — LEVOTHYROXINE SODIUM 88 UG/1
88 TABLET ORAL DAILY
Qty: 30 TABLET | Refills: 0 | Status: SHIPPED | OUTPATIENT
Start: 2025-02-03

## 2025-02-04 NOTE — TELEPHONE ENCOUNTER
Please let pt know that no further refills will be given unless she does fasting labs. Orders were just placed. Thank you.   
no...

## 2025-02-05 DIAGNOSIS — E78.2 MIXED HYPERLIPIDEMIA: ICD-10-CM

## 2025-02-06 RX ORDER — ROSUVASTATIN CALCIUM 10 MG/1
10 TABLET, COATED ORAL DAILY
Qty: 30 TABLET | Refills: 0 | Status: SHIPPED | OUTPATIENT
Start: 2025-02-06

## 2025-02-06 NOTE — TELEPHONE ENCOUNTER
AGAIN- Please let pt know that no further refills will be given w/o fasting labs and OV. Orders are in chart. Thank you.

## 2025-02-24 ENCOUNTER — TELEPHONE (OUTPATIENT)
Age: 60
End: 2025-02-24

## 2025-03-01 LAB
ALBUMIN SERPL-MCNC: 4.6 G/DL (ref 3.6–5.1)
ALBUMIN/GLOB SERPL: 1.8 (CALC) (ref 1–2.5)
ALP SERPL-CCNC: 63 U/L (ref 37–153)
ALT SERPL-CCNC: 17 U/L (ref 6–29)
AST SERPL-CCNC: 18 U/L (ref 10–35)
BASOPHILS # BLD AUTO: 50 CELLS/UL (ref 0–200)
BASOPHILS NFR BLD AUTO: 0.9 %
BILIRUB SERPL-MCNC: 0.4 MG/DL (ref 0.2–1.2)
BUN SERPL-MCNC: 14 MG/DL (ref 7–25)
BUN/CREAT SERPL: NORMAL (CALC) (ref 6–22)
CALCIUM SERPL-MCNC: 9.7 MG/DL (ref 8.6–10.4)
CHLORIDE SERPL-SCNC: 105 MMOL/L (ref 98–110)
CHOLEST SERPL-MCNC: 227 MG/DL
CHOLEST/HDLC SERPL: 1.9 (CALC)
CO2 SERPL-SCNC: 27 MMOL/L (ref 20–32)
CREAT SERPL-MCNC: 0.93 MG/DL (ref 0.5–1.03)
EOSINOPHIL # BLD AUTO: 182 CELLS/UL (ref 15–500)
EOSINOPHIL NFR BLD AUTO: 3.3 %
ERYTHROCYTE [DISTWIDTH] IN BLOOD BY AUTOMATED COUNT: 12.4 % (ref 11–15)
GFR/BSA.PRED SERPLBLD CYS-BASED-ARV: 71 ML/MIN/1.73M2
GLOBULIN SER CALC-MCNC: 2.6 G/DL (CALC) (ref 1.9–3.7)
GLUCOSE SERPL-MCNC: 94 MG/DL (ref 65–99)
HCT VFR BLD AUTO: 41.4 % (ref 35–45)
HDLC SERPL-MCNC: 121 MG/DL
HGB BLD-MCNC: 14.1 G/DL (ref 11.7–15.5)
LDLC SERPL CALC-MCNC: 89 MG/DL (CALC)
LYMPHOCYTES # BLD AUTO: 1821 CELLS/UL (ref 850–3900)
LYMPHOCYTES NFR BLD AUTO: 33.1 %
MCH RBC QN AUTO: 29.3 PG (ref 27–33)
MCHC RBC AUTO-ENTMCNC: 34.1 G/DL (ref 32–36)
MCV RBC AUTO: 86.1 FL (ref 80–100)
MONOCYTES # BLD AUTO: 528 CELLS/UL (ref 200–950)
MONOCYTES NFR BLD AUTO: 9.6 %
NEUTROPHILS # BLD AUTO: 2921 CELLS/UL (ref 1500–7800)
NEUTROPHILS NFR BLD AUTO: 53.1 %
NONHDLC SERPL-MCNC: 106 MG/DL (CALC)
PLATELET # BLD AUTO: 334 THOUSAND/UL (ref 140–400)
PMV BLD REES-ECKER: 10 FL (ref 7.5–12.5)
POTASSIUM SERPL-SCNC: 4.5 MMOL/L (ref 3.5–5.3)
PROT SERPL-MCNC: 7.2 G/DL (ref 6.1–8.1)
RBC # BLD AUTO: 4.81 MILLION/UL (ref 3.8–5.1)
SODIUM SERPL-SCNC: 140 MMOL/L (ref 135–146)
TRIGL SERPL-MCNC: 84 MG/DL
TSH SERPL-ACNC: 0.58 MIU/L (ref 0.4–4.5)
WBC # BLD AUTO: 5.5 THOUSAND/UL (ref 3.8–10.8)

## 2025-03-02 ENCOUNTER — RESULTS FOLLOW-UP (OUTPATIENT)
Dept: FAMILY MEDICINE CLINIC | Facility: CLINIC | Age: 60
End: 2025-03-02

## 2025-03-02 DIAGNOSIS — E03.9 ACQUIRED HYPOTHYROIDISM: ICD-10-CM

## 2025-03-02 DIAGNOSIS — E78.2 MIXED HYPERLIPIDEMIA: Primary | ICD-10-CM

## 2025-03-02 NOTE — RESULT ENCOUNTER NOTE
Please let pt know that her lab results were excellent. No changes to her meds at this time. I have ordered labs for her to do in 6 months. Thank you.

## 2025-03-05 DIAGNOSIS — E03.9 ACQUIRED HYPOTHYROIDISM: ICD-10-CM

## 2025-03-05 DIAGNOSIS — E78.2 MIXED HYPERLIPIDEMIA: ICD-10-CM

## 2025-03-06 RX ORDER — ROSUVASTATIN CALCIUM 10 MG/1
10 TABLET, COATED ORAL DAILY
Qty: 30 TABLET | Refills: 0 | Status: SHIPPED | OUTPATIENT
Start: 2025-03-06

## 2025-03-06 RX ORDER — LEVOTHYROXINE SODIUM 88 UG/1
88 TABLET ORAL DAILY
Qty: 30 TABLET | Refills: 0 | Status: SHIPPED | OUTPATIENT
Start: 2025-03-06

## 2025-03-06 NOTE — TELEPHONE ENCOUNTER
Please call patient and let them know they need to have an office visit with me before they will receive any further refills other than what was given today. PT did have her labs done but has not been seen in over one year. Thank you.

## 2025-04-09 DIAGNOSIS — E78.2 MIXED HYPERLIPIDEMIA: ICD-10-CM

## 2025-04-09 DIAGNOSIS — E03.9 ACQUIRED HYPOTHYROIDISM: ICD-10-CM

## 2025-04-09 RX ORDER — LEVOTHYROXINE SODIUM 88 UG/1
88 TABLET ORAL DAILY
Qty: 30 TABLET | Refills: 0 | OUTPATIENT
Start: 2025-04-09

## 2025-04-09 RX ORDER — ROSUVASTATIN CALCIUM 10 MG/1
10 TABLET, COATED ORAL DAILY
Qty: 30 TABLET | Refills: 0 | OUTPATIENT
Start: 2025-04-09

## 2025-04-10 RX ORDER — LEVOTHYROXINE SODIUM 88 UG/1
88 TABLET ORAL DAILY
Qty: 30 TABLET | Refills: 0 | OUTPATIENT
Start: 2025-04-10

## 2025-04-10 RX ORDER — ROSUVASTATIN CALCIUM 10 MG/1
10 TABLET, COATED ORAL DAILY
Qty: 30 TABLET | Refills: 0 | OUTPATIENT
Start: 2025-04-10

## 2025-04-30 ENCOUNTER — OFFICE VISIT (OUTPATIENT)
Dept: FAMILY MEDICINE CLINIC | Facility: CLINIC | Age: 60
End: 2025-04-30
Payer: COMMERCIAL

## 2025-04-30 VITALS
HEART RATE: 65 BPM | HEIGHT: 65 IN | OXYGEN SATURATION: 98 % | DIASTOLIC BLOOD PRESSURE: 64 MMHG | SYSTOLIC BLOOD PRESSURE: 112 MMHG | BODY MASS INDEX: 24.32 KG/M2 | WEIGHT: 146 LBS

## 2025-04-30 DIAGNOSIS — E78.2 MIXED HYPERLIPIDEMIA: ICD-10-CM

## 2025-04-30 DIAGNOSIS — Z12.31 ENCOUNTER FOR SCREENING MAMMOGRAM FOR BREAST CANCER: ICD-10-CM

## 2025-04-30 DIAGNOSIS — Z00.00 ANNUAL PHYSICAL EXAM: Primary | ICD-10-CM

## 2025-04-30 DIAGNOSIS — Z11.4 SCREENING FOR HIV (HUMAN IMMUNODEFICIENCY VIRUS): ICD-10-CM

## 2025-04-30 DIAGNOSIS — E03.9 ACQUIRED HYPOTHYROIDISM: ICD-10-CM

## 2025-04-30 DIAGNOSIS — Z00.00 HEALTHCARE MAINTENANCE: ICD-10-CM

## 2025-04-30 DIAGNOSIS — Z11.59 NEED FOR HEPATITIS C SCREENING TEST: ICD-10-CM

## 2025-04-30 PROCEDURE — 99396 PREV VISIT EST AGE 40-64: CPT | Performed by: PHYSICIAN ASSISTANT

## 2025-04-30 PROCEDURE — 99213 OFFICE O/P EST LOW 20 MIN: CPT | Performed by: PHYSICIAN ASSISTANT

## 2025-04-30 RX ORDER — LEVOTHYROXINE SODIUM 88 UG/1
88 TABLET ORAL DAILY
Qty: 90 TABLET | Refills: 3 | Status: SHIPPED | OUTPATIENT
Start: 2025-04-30

## 2025-04-30 RX ORDER — ROSUVASTATIN CALCIUM 10 MG/1
10 TABLET, COATED ORAL DAILY
Qty: 90 TABLET | Refills: 3 | Status: SHIPPED | OUTPATIENT
Start: 2025-04-30

## 2025-04-30 NOTE — ASSESSMENT & PLAN NOTE
In 2016 patient replaced on statin secondary to LDL being in the 170s.  Currently LDL is 89 and well-controlled last in February.  Check 6 months from February lipid panel already ordered.  Orders:  •  rosuvastatin (CRESTOR) 10 MG tablet; Take 1 tablet (10 mg total) by mouth daily

## 2025-04-30 NOTE — ASSESSMENT & PLAN NOTE
TSH within normal limits in February.  Refills of levothyroxine given.  Orders:  •  levothyroxine 88 mcg tablet; Take 1 tablet (88 mcg total) by mouth daily

## 2025-04-30 NOTE — PROGRESS NOTES
Adult Annual Physical  Name: Reanna Llanes      : 1965      MRN: [id number]  Encounter Provider: Mary Grace Parks PA-C  Encounter Date: 2025   Encounter department: [facility] PRIMARY [facility]    :  Assessment & Plan  Annual physical exam  Fasting blood work up-to-date, colonoscopy up-to-date.  Needs mammogram.  Recommend shingles vaccine and updated Adacel. We are currently out of shingles supply but pt can get at pharmacy.        Mixed hyperlipidemia  In  patient replaced on statin secondary to LDL being in the 170s.  Currently LDL is 89 and well-controlled last in February.  Check 6 months from February lipid panel already ordered.  Orders:  •  rosuvastatin (CRESTOR) 10 MG tablet; Take 1 tablet (10 mg total) by mouth daily    Acquired hypothyroidism  TSH within normal limits in February.  Refills of levothyroxine given.  Orders:  •  levothyroxine 88 mcg tablet; Take 1 tablet (88 mcg total) by mouth daily    Encounter for screening mammogram for breast cancer    Orders:  •  Mammo screening bilateral w 3d and cad; Future    Need for hepatitis C screening test    Orders:  •  Hepatitis C Antibody; Future    Screening for HIV (human immunodeficiency virus)    Orders:  •  HIV 1/2 AG/AB w Reflex [facility] for 2 yr old and above; Future    Healthcare maintenance    Orders:  •  Ambulatory referral to Obstetrics / Gynecology; Future        Preventive Screenings:  - Diabetes Screening: screening up-to-date  - Cholesterol Screening: screening not indicated and has hyperlipidemia   - Hepatitis C screening: orders placed   - HIV screening: orders placed   - Cervical cancer screening: risks/benefits discussed   - Breast cancer screening: orders placed   - Colon cancer screening: screening up-to-date   - Lung cancer screening: screening not indicated     Immunizations:  - Immunizations due: Tdap and Zoster (Shingrix)  - Risks/benefits immunizations discussed        Depression Screening and Follow-up Plan:  "Patient was screened for depression during today's encounter. They screened negative with a PHQ-2 score of 0.          History of Present Illness     Adult Annual Physical:  Patient presents for annual physical.     Diet and Physical Activity:  - Diet/Nutrition: well balanced diet.  - Exercise: walking and 5-7 times a week on average.    Depression Screening:  - PHQ-2 Score: 0    General Health:  - Sleep: sleeps well.  - Hearing: normal hearing bilateral ears.  - Vision: wears glasses and contacts.  - Dental: regular dental visits.    /GYN Health:  - Follows with GYN: no.   - Menopause: postmenopausal.   - Contraception: menopause.      Advanced Care Planning:  - Has an advanced directive?: no    - Has a durable medical POA?: no    - ACP document given to patient?: yes      Review of Systems   Constitutional: Negative.    HENT: Negative.     Eyes: Negative.    Respiratory: Negative.     Cardiovascular: Negative.    Gastrointestinal: Negative.    Endocrine: Negative.    Genitourinary: Negative.    Musculoskeletal: Negative.    Skin: Negative.    Allergic/Immunologic: Negative.    Neurological: Negative.    Hematological: Negative.    Psychiatric/Behavioral: Negative.           Objective   /64 (BP Location: Left arm, Patient Position: Sitting, Cuff Size: Standard)   Pulse 65   Ht 5' 5\" (1.651 m)   Wt 66.2 kg (146 lb)   SpO2 98%   BMI 24.30 kg/m²     Physical Exam  Vitals and nursing note reviewed.   Constitutional:       General: She is not in acute distress.     Appearance: She is well-developed. She is not diaphoretic.   HENT:      Head: Normocephalic and atraumatic.      Right Ear: External ear normal.      Left Ear: External ear normal.      Nose: Nose normal.      Mouth/Throat:      Pharynx: No oropharyngeal exudate.   Eyes:      General: No scleral icterus.        Right eye: No discharge.         Left eye: No discharge.      Conjunctiva/sclera: Conjunctivae normal.      Pupils: Pupils are equal, " round, and reactive to light.   Neck:      Thyroid: No thyromegaly.      Vascular: No JVD.      Trachea: No tracheal deviation.   Cardiovascular:      Rate and Rhythm: Normal rate and regular rhythm.      Heart sounds: Normal heart sounds. No murmur heard.     No friction rub. No gallop.   Pulmonary:      Effort: Pulmonary effort is normal. No respiratory distress.      Breath sounds: Normal breath sounds. No stridor. No wheezing or rales.   Chest:      Chest wall: No tenderness.   Abdominal:      General: Bowel sounds are normal. There is no distension.      Palpations: Abdomen is soft. There is no mass.      Tenderness: There is no abdominal tenderness. There is no guarding or rebound.      Hernia: No hernia is present.   Musculoskeletal:         General: No deformity. Normal range of motion.      Cervical back: Normal range of motion and neck supple.   Lymphadenopathy:      Cervical: No cervical adenopathy.   Skin:     General: Skin is warm and dry.      Capillary Refill: Capillary refill takes more than 3 seconds.      Findings: No rash.   Neurological:      Mental Status: She is alert and oriented to person, place, and time.      Motor: No abnormal muscle tone.      Coordination: Coordination normal.      Deep Tendon Reflexes: Reflexes normal.   Psychiatric:         Behavior: Behavior normal.         Thought Content: Thought content normal.         Judgment: Judgment normal.

## 2025-04-30 NOTE — PATIENT INSTRUCTIONS
"1. Annual physical exam  2. Mixed hyperlipidemia  Assessment & Plan:  In 2016 patient replaced on statin secondary to LDL being in the 170s.  Currently LDL is 89 and well-controlled last in February.  Check 6 months from February lipid panel already ordered.  Orders:    rosuvastatin (CRESTOR) 10 MG tablet; Take 1 tablet (10 mg total) by mouth daily    Orders:  -     rosuvastatin (CRESTOR) 10 MG tablet; Take 1 tablet (10 mg total) by mouth daily  3. Acquired hypothyroidism  Assessment & Plan:  TSH within normal limits in February.  Refills of levothyroxine given.  Orders:    levothyroxine 88 mcg tablet; Take 1 tablet (88 mcg total) by mouth daily    Orders:  -     levothyroxine 88 mcg tablet; Take 1 tablet (88 mcg total) by mouth daily  4. Encounter for screening mammogram for breast cancer  -     Mammo screening bilateral w 3d and cad; Future; Expected date: 04/30/2025  5. Need for hepatitis C screening test  -     Hepatitis C Antibody; Future  6. Screening for HIV (human immunodeficiency virus)  -     HIV 1/2 AG/AB w Reflex [facility] for 2 yr old and above; Future  7. Healthcare maintenance  -     Ambulatory referral to Obstetrics / Gynecology; Future    Patient Education     Routine physical for adults   The Basics   Written by the doctors and editors at UpToDate   What is a physical? -- A physical is a routine visit, or \"check-up,\" with your doctor. You might also hear it called a \"wellness visit\" or \"preventive visit.\"  During each visit, the doctor will:   Ask about your physical and mental health   Ask about your habits, behaviors, and lifestyle   Do an exam   Give you vaccines if needed   Talk to you about any medicines you take   Give advice about your health   Answer your questions  Getting regular check-ups is an important part of taking care of your health. It can help your doctor find and treat any problems you have. But it's also important for preventing health problems.  A routine physical is different " "from a \"sick visit.\" A sick visit is when you see a doctor because of a health concern or problem. Since physicals are scheduled ahead of time, you can think about what you want to ask the doctor.  How often should I get a physical? -- It depends on your age and health. In general, for people age 21 years and older:   If you are younger than 50 years, you might be able to get a physical every 3 years.   If you are 50 years or older, your doctor might recommend a physical every year.  If you have an ongoing health condition, like diabetes or high blood pressure, your doctor will probably want to see you more often.  What happens during a physical? -- In general, each visit will include:   Physical exam - The doctor or nurse will check your height, weight, heart rate, and blood pressure. They will also look at your eyes and ears. They will ask about how you are feeling and whether you have any symptoms that bother you.   Medicines - It's a good idea to bring a list of all the medicines you take to each doctor visit. Your doctor will talk to you about your medicines and answer any questions. Tell them if you are having any side effects that bother you. You should also tell them if you are having trouble paying for any of your medicines.   Habits and behaviors - This includes:   Your diet   Your exercise habits   Whether you smoke, drink alcohol, or use drugs   Whether you are sexually active   Whether you feel safe at home  Your doctor will talk to you about things you can do to improve your health and lower your risk of health problems. They will also offer help and support. For example, if you want to quit smoking, they can give you advice and might prescribe medicines. If you want to improve your diet or get more physical activity, they can help you with this, too.   Lab tests, if needed - The tests you get will depend on your age and situation. For example, your doctor might want to check your:   Cholesterol   " "Blood sugar   Iron level   Vaccines - The recommended vaccines will depend on your age, health, and what vaccines you already had. Vaccines are very important because they can prevent certain serious or deadly infections.   Discussion of screening - \"Screening\" means checking for diseases or other health problems before they cause symptoms. Your doctor can recommend screening based on your age, risk, and preferences. This might include tests to check for:   Cancer, such as breast, prostate, cervical, ovarian, colorectal, prostate, lung, or skin cancer   Sexually transmitted infections, such as chlamydia and gonorrhea   Mental health conditions like depression and anxiety  Your doctor will talk to you about the different types of screening tests. They can help you decide which screenings to have. They can also explain what the results might mean.   Answering questions - The physical is a good time to ask the doctor or nurse questions about your health. If needed, they can refer you to other doctors or specialists, too.  Adults older than 65 years often need other care, too. As you get older, your doctor will talk to you about:   How to prevent falling at home   Hearing or vision tests   Memory testing   How to take your medicines safely   Making sure that you have the help and support you need at home  All topics are updated as new evidence becomes available and our peer review process is complete.  This topic retrieved from [employer] on: May 02, 2024.  Topic 022651 Version 1.0  Release: 32.4.3 - C32.122  © 2024 UpToDate, Inc. and/or its affiliates. All rights reserved.  Consumer Information Use and Disclaimer   Disclaimer: This generalized information is a limited summary of diagnosis, treatment, and/or medication information. It is not meant to be comprehensive and should be used as a tool to help the user understand and/or assess potential diagnostic and treatment options. It does NOT include all information about " conditions, treatments, medications, side effects, or risks that may apply to a specific patient. It is not intended to be medical advice or a substitute for the medical advice, diagnosis, or treatment of a health care provider based on the health care provider's examination and assessment of a patient's specific and unique circumstances. Patients must speak with a health care provider for complete information about their health, medical questions, and treatment options, including any risks or benefits regarding use of medications. This information does not endorse any treatments or medications as safe, effective, or approved for treating a specific patient. UpToDate, Inc. and its affiliates disclaim any warranty or liability relating to this information or the use thereof.The use of this information is governed by the Terms of Use, available at https://www.[employer]Stonest[employer] Oneuwer.com/en/know/clinical-effectiveness-terms. 2024© UpToDate, Inc. and its affiliates and/or licensors. All rights reserved.  Copyright   © 2024 UpToDate, Inc. and/or its affiliates. All rights reserved.

## 2025-05-05 DIAGNOSIS — T78.40XD ALLERGY, SUBSEQUENT ENCOUNTER: Primary | ICD-10-CM

## 2025-05-05 RX ORDER — FLUTICASONE PROPIONATE 50 MCG
1 SPRAY, SUSPENSION (ML) NASAL DAILY
Qty: 9.9 ML | Refills: 11 | Status: SHIPPED | OUTPATIENT
Start: 2025-05-05

## 2025-05-06 ENCOUNTER — TELEPHONE (OUTPATIENT)
Age: 60
End: 2025-05-06

## 2025-05-06 NOTE — TELEPHONE ENCOUNTER
PA for fluticasone 50mcg/act SUBMITTED to [employer]    via    []CMM-KEY:   [x][employer]-Case ID # [id number]   []Availity-Auth ID # NDC #   []Faxed to plan   []Other website   []Phone call Case ID #     [x]PA sent as URGENT    All office notes, labs and other pertaining documents and studies sent. Clinical questions answered. Awaiting determination from insurance company.     Turnaround time for your insurance to make a decision on your Prior Authorization can take 7-21 business days.

## 2025-05-29 ENCOUNTER — TELEPHONE (OUTPATIENT)
Age: 60
End: 2025-05-29

## 2025-05-29 ENCOUNTER — OFFICE VISIT (OUTPATIENT)
Dept: FAMILY MEDICINE CLINIC | Facility: CLINIC | Age: 60
End: 2025-05-29
Payer: COMMERCIAL

## 2025-05-29 VITALS
HEIGHT: 65 IN | HEART RATE: 72 BPM | WEIGHT: 150.8 LBS | TEMPERATURE: 97.1 F | DIASTOLIC BLOOD PRESSURE: 72 MMHG | OXYGEN SATURATION: 99 % | RESPIRATION RATE: 16 BRPM | BODY MASS INDEX: 25.12 KG/M2 | SYSTOLIC BLOOD PRESSURE: 102 MMHG

## 2025-05-29 DIAGNOSIS — N30.00 ACUTE CYSTITIS WITHOUT HEMATURIA: ICD-10-CM

## 2025-05-29 DIAGNOSIS — R30.0 DYSURIA: Primary | ICD-10-CM

## 2025-05-29 LAB
SL AMB  POCT GLUCOSE, UA: NORMAL
SL AMB LEUKOCYTE ESTERASE,UA: NORMAL
SL AMB POCT BILIRUBIN,UA: NORMAL
SL AMB POCT BLOOD,UA: NORMAL
SL AMB POCT CLARITY,UA: NORMAL
SL AMB POCT COLOR,UA: YELLOW
SL AMB POCT KETONES,UA: NORMAL
SL AMB POCT NITRITE,UA: NORMAL
SL AMB POCT PH,UA: 5.5
SL AMB POCT SPECIFIC GRAVITY,UA: 1.02
SL AMB POCT URINE PROTEIN: NORMAL
SL AMB POCT UROBILINOGEN: 0.2

## 2025-05-29 PROCEDURE — 81002 URINALYSIS NONAUTO W/O SCOPE: CPT | Performed by: NURSE PRACTITIONER

## 2025-05-29 PROCEDURE — 99214 OFFICE O/P EST MOD 30 MIN: CPT | Performed by: NURSE PRACTITIONER

## 2025-05-29 RX ORDER — CEPHALEXIN 500 MG/1
500 CAPSULE ORAL 2 TIMES DAILY
Qty: 14 CAPSULE | Refills: 0 | Status: SHIPPED | OUTPATIENT
Start: 2025-05-29 | End: 2025-06-05

## 2025-05-29 NOTE — PROGRESS NOTES
"Name: Reanna Llanes      : 1965      MRN: [id number]  Encounter Provider: PRESTON Sauceda  Encounter Date: 2025   Encounter department: [facility]  :  Assessment & Plan  Dysuria    Orders:  •  POCT urine dip  •  cephalexin (KEFLEX) 500 mg capsule; Take 1 capsule (500 mg total) by mouth in the morning and 1 capsule (500 mg total) before bedtime. Do all this for 7 days.    Acute cystitis without hematuria  UA positive patient symptomatic  Urine culture sent   Start keflex   Continue increase water   Can take azo     Orders:  •  Urine culture  •  cephalexin (KEFLEX) 500 mg capsule; Take 1 capsule (500 mg total) by mouth in the morning and 1 capsule (500 mg total) before bedtime. Do all this for 7 days.           History of Present Illness   Patient noticed uti symptoms   Increased water then it felt fine   Did this for two days   But reports that last night was worse she had increased pain and was using heating pad       Review of Systems   Constitutional:  Negative for chills, diaphoresis, fatigue and fever.   Gastrointestinal:  Negative for abdominal pain, nausea and vomiting.   Genitourinary:  Positive for dysuria, frequency and urgency. Negative for difficulty urinating, flank pain, hematuria, pelvic pain, vaginal discharge and vaginal pain.   Musculoskeletal:  Negative for back pain.   Neurological:  Negative for headaches.       Objective   /72 (BP Location: Left arm, Patient Position: Sitting, Cuff Size: Adult)   Pulse 72   Temp (!) 97.1 °F (36.2 °C) (Temporal)   Resp 16   Ht 5' 5\" (1.651 m)   Wt 68.4 kg (150 lb 12.8 oz)   SpO2 99%   BMI 25.09 kg/m²      Physical Exam  Vitals and nursing note reviewed.   Constitutional:       General: She is not in acute distress.     Appearance: Normal appearance. She is normal weight. She is not ill-appearing, toxic-appearing or diaphoretic.   HENT:      Head: Normocephalic and atraumatic.     Cardiovascular:      Rate " and Rhythm: Normal rate and regular rhythm.      Heart sounds: Normal heart sounds.   Pulmonary:      Effort: Pulmonary effort is normal.      Breath sounds: Normal breath sounds.   Abdominal:      General: Abdomen is flat. Bowel sounds are normal. There is no distension.      Palpations: Abdomen is soft.      Tenderness: There is abdominal tenderness in the suprapubic area. There is no right CVA tenderness or left CVA tenderness.     Neurological:      Mental Status: She is alert and oriented to person, place, and time.     Psychiatric:         Mood and Affect: Mood normal.         Behavior: Behavior normal.

## 2025-05-29 NOTE — TELEPHONE ENCOUNTER
Patient advised she has a UTI; constant burning and pain when urinating that started about 2/3 days ago. Got worse last night. She was hoping to get some antibiotics sent to pharmacy instead of coming in the office. She would like a call back if this can be done; [phone].

## 2025-05-29 NOTE — PATIENT INSTRUCTIONS
"Patient Education     Urinary tract infections in adults   The Basics   Written by the doctors and editors at [facility]   What is the urinary tract? -- The urinary tract is the group of organs in the body that handle urine (figure 1). The urinary tract includes the:   Kidneys - These are 2 bean-shaped organs that filter the blood to make urine.   Bladder - This is a balloon-shaped organ that stores urine.   Ureters - These are 2 tubes that carry urine from the kidneys to the bladder.   Urethra - This is the tube that carries urine from the bladder to the outside of the body.  What are urinary tract infections? -- Urinary tract infections (\"UTIs\") are infections that affect either the bladder or the kidneys:   Bladder infections are more common than kidney infections. They happen when bacteria get into the urethra and travel up into the bladder. The medical term for bladder infection is \"cystitis.\" Most of the time, when people talk about a UTI, they mean a bladder infection.   Kidney infections happen when the bacteria travel even higher, up into the kidneys. The medical term for kidney infection is \"pyelonephritis.\" This is more serious than a bladder infection, and can lead to other serious problems if it is not treated properly.  Both bladder and kidney infections are more common in females than males.  The risk of UTIs is also higher in people who have a urinary catheter. A catheter is a thin, flexible tube that drains urine from the bladder. It might be used in people who are in the hospital and cannot urinate the normal way.  What are the symptoms of a bladder infection? -- The symptoms include:   Pain or a burning feeling when you urinate   The need to urinate often   The need to urinate suddenly or in a hurry   Blood in the urine  What are the symptoms of a kidney infection? -- The symptoms of a kidney infection can include the same urinary symptoms that happen with a bladder infection.  In addition, kidney " infections can cause:   Fever   Back pain   Nausea or vomiting  How do I find out if I have a UTI? -- If you think that you might have a UTI, call your doctor or nurse. Sometimes, they can tell if you have a UTI just by learning about your symptoms.  Your doctor or nurse might do a simple urine test. If they think that you might have a kidney infection or are unsure what is causing your symptoms, they might also do a more involved urine test to check for bacteria.  How are UTIs treated? -- Most UTIs are treated with antibiotic pills. These pills work by killing the germs that cause the infection.   If you have a bladder infection, you will probably need to take antibiotics for 3 to 7 days.   If you have a kidney infection, you will probably need to take antibiotics for longer, maybe for up to 10 days. If you have a kidney infection, it's also possible that you will need to be treated in the hospital.  Your symptoms should begin to improve within a day of starting antibiotics. But you should finish all of the antibiotic pills. Otherwise, the infection might come back.  If needed, you can also take a medicine to numb your bladder. This medicine eases the pain caused by UTIs. It also reduces the need to urinate.  What if I get bladder infections a lot? -- First, check with your doctor or nurse to make sure that you are really having bladder infections. The symptoms of bladder infection can be caused by other things. Your doctor or nurse will want to see if those problems might be causing your symptoms.  But if you are really having repeated infections, there are things that you can do to keep from getting more infections. These include:   Drinking more fluid - This can help prevent bladder infections.   Vaginal estrogen - If you have already been through menopause, your doctor might suggest this. Vaginal estrogen comes in a cream or a flexible ring that you put into your vagina. It can help prevent bladder  "infections.  Other things that might help:   Avoid spermicides (sperm-killing creams or gels) - Spermicide is a form of birth control. It seems to increase the risk of bladder infections in some females, especially when used with a diaphragm. If you use spermicide and get a lot of bladder infections, you might want to try switching to a different form of birth control.   Urinate right after sex - Some doctors think this helps, because it helps flush out germs that might get into the bladder during sex. There is no proof it works, but it also cannot hurt.  If you get a lot of bladder infections, and the above methods have not helped, your doctor might give you antibiotics to help prevent infection. But long-term use of antibiotics has downsides, so doctors usually suggest trying other things first.  Can cranberry juice or other products prevent bladder infections? -- People often wonder about \"natural\" products that claim to help prevent bladder infections. These include cranberry juice and other cranberry products, probiotics, vitamin C, and D-mannose. There is not good evidence that these things work. However, there is also no clear evidence that they are harmful. If you have questions about these or other products, talk with your doctor or nurse.  All topics are updated as new evidence becomes available and our peer review process is complete.  This topic retrieved from [employer] on: May 09, 2024.  Topic 96367 Version 24.0  Release: 32.4.3 - C32.128  © 2024 UpToDate, Inc. and/or its affiliates. All rights reserved.  figure 1: Anatomy of the urinary tract     Urine is made by the kidneys. It passes from the kidneys into the bladder through 2 tubes called the ureters. Then, it leaves the bladder through another tube called the urethra.  Graphic 54796 Version 8.0  Consumer Information Use and Disclaimer   Disclaimer: This generalized information is a limited summary of diagnosis, treatment, and/or medication " information. It is not meant to be comprehensive and should be used as a tool to help the user understand and/or assess potential diagnostic and treatment options. It does NOT include all information about conditions, treatments, medications, side effects, or risks that may apply to a specific patient. It is not intended to be medical advice or a substitute for the medical advice, diagnosis, or treatment of a health care provider based on the health care provider's examination and assessment of a patient's specific and unique circumstances. Patients must speak with a health care provider for complete information about their health, medical questions, and treatment options, including any risks or benefits regarding use of medications. This information does not endorse any treatments or medications as safe, effective, or approved for treating a specific patient. UpToDate, Inc. and its affiliates disclaim any warranty or liability relating to this information or the use thereof.The use of this information is governed by the Terms of Use, available at https://www.[employer].com/en/know/clinical-effectiveness-terms. 2024© UpToDate, Inc. and its affiliates and/or licensors. All rights reserved.  Copyright   © 2024 UpToDate, Inc. and/or its affiliates. All rights reserved.

## 2025-06-01 LAB
SL AMB ISOLATE 1: ABNORMAL
SPECIMEN SOURCE CVX/VAG CYTO: ABNORMAL
TRANSFUSION STATUS PATIENT QL: ABNORMAL

## 2025-06-04 ENCOUNTER — RESULTS FOLLOW-UP (OUTPATIENT)
Dept: FAMILY MEDICINE CLINIC | Facility: CLINIC | Age: 60
End: 2025-06-04